# Patient Record
Sex: FEMALE | Race: WHITE | Employment: OTHER | URBAN - METROPOLITAN AREA
[De-identification: names, ages, dates, MRNs, and addresses within clinical notes are randomized per-mention and may not be internally consistent; named-entity substitution may affect disease eponyms.]

---

## 2017-12-29 ENCOUNTER — HOSPITAL ENCOUNTER (OUTPATIENT)
Dept: PREADMISSION TESTING | Age: 77
Discharge: HOME | End: 2017-12-29
Payer: MEDICARE

## 2017-12-29 VITALS
BODY MASS INDEX: 26.06 KG/M2 | WEIGHT: 138.01 LBS | OXYGEN SATURATION: 96 % | SYSTOLIC BLOOD PRESSURE: 174 MMHG | HEART RATE: 56 BPM | DIASTOLIC BLOOD PRESSURE: 82 MMHG | TEMPERATURE: 98 F | HEIGHT: 61 IN | RESPIRATION RATE: 20 BRPM

## 2017-12-29 LAB
ANION GAP SERPL CALC-SCNC: 9 MMOL/L (ref 5–15)
BASOPHILS # BLD: 0 K/UL (ref 0–0.1)
BASOPHILS NFR BLD: 0 % (ref 0–1)
BUN SERPL-MCNC: 15 MG/DL (ref 6–20)
BUN/CREAT SERPL: 25 (ref 12–20)
CALCIUM SERPL-MCNC: 10.1 MG/DL (ref 8.5–10.1)
CHLORIDE SERPL-SCNC: 106 MMOL/L (ref 97–108)
CO2 SERPL-SCNC: 29 MMOL/L (ref 21–32)
CREAT SERPL-MCNC: 0.6 MG/DL (ref 0.55–1.02)
EOSINOPHIL # BLD: 0 K/UL (ref 0–0.4)
EOSINOPHIL NFR BLD: 0 % (ref 0–7)
ERYTHROCYTE [DISTWIDTH] IN BLOOD BY AUTOMATED COUNT: 13.5 % (ref 11.5–14.5)
GLUCOSE SERPL-MCNC: 99 MG/DL (ref 65–100)
HCT VFR BLD AUTO: 40.3 % (ref 35–47)
HGB BLD-MCNC: 13 G/DL (ref 11.5–16)
LYMPHOCYTES # BLD: 2 K/UL (ref 0.8–3.5)
LYMPHOCYTES NFR BLD: 17 % (ref 12–49)
MCH RBC QN AUTO: 28.8 PG (ref 26–34)
MCHC RBC AUTO-ENTMCNC: 32.3 G/DL (ref 30–36.5)
MCV RBC AUTO: 89.2 FL (ref 80–99)
MONOCYTES # BLD: 0.5 K/UL (ref 0–1)
MONOCYTES NFR BLD: 5 % (ref 5–13)
NEUTS SEG # BLD: 8.8 K/UL (ref 1.8–8)
NEUTS SEG NFR BLD: 78 % (ref 32–75)
PLATELET # BLD AUTO: 303 K/UL (ref 150–400)
POTASSIUM SERPL-SCNC: 4.3 MMOL/L (ref 3.5–5.1)
RBC # BLD AUTO: 4.52 M/UL (ref 3.8–5.2)
SODIUM SERPL-SCNC: 144 MMOL/L (ref 136–145)
WBC # BLD AUTO: 11.3 K/UL (ref 3.6–11)

## 2017-12-29 PROCEDURE — 93005 ELECTROCARDIOGRAM TRACING: CPT

## 2017-12-29 PROCEDURE — 36415 COLL VENOUS BLD VENIPUNCTURE: CPT | Performed by: SURGERY

## 2017-12-29 PROCEDURE — 80048 BASIC METABOLIC PNL TOTAL CA: CPT | Performed by: SURGERY

## 2017-12-29 PROCEDURE — 85025 COMPLETE CBC W/AUTO DIFF WBC: CPT | Performed by: SURGERY

## 2017-12-29 RX ORDER — DORZOLAMIDE HCL 20 MG/ML
2 SOLUTION/ DROPS OPHTHALMIC 2 TIMES DAILY
COMMUNITY

## 2017-12-29 RX ORDER — PREDNISONE 1 MG/1
2 TABLET ORAL
COMMUNITY

## 2017-12-29 RX ORDER — PYRIDOXINE HCL (VITAMIN B6) 100 MG
100 TABLET ORAL
COMMUNITY

## 2017-12-29 RX ORDER — ATENOLOL 25 MG/1
12.5 TABLET ORAL DAILY
COMMUNITY

## 2017-12-29 RX ORDER — MULTIVITAMIN
1 TABLET ORAL
COMMUNITY

## 2017-12-29 NOTE — PERIOP NOTES
[facility] PREOPERATIVE INSTRUCTIONS    Surgery Date:   1/3/18    Surgery arrival time given by surgeon: NO  (If [facility] staff will call you between 3pm - 7pm the day before surgery with your arrival time. If your surgery is on a Monday, we will call you the preceding Friday. Please call [phone] after 7pm if you did not receive your arrival time.)  1. Report to the 2nd Floor Admitting Desk on the day of your surgery. Bring your insurance card, photo identification, and any copayment (if applicable). 2. You must have a responsible adult to drive you home and stay with you the first 24 hours after surgery if you are going home the same day of your surgery. 3. Nothing to eat or drink after midnight the night before surgery. This means NO water, gum, mints, coffee, juice, etc.    4. MEDICATIONS TO TAKE THE MORNING OF SURGERY WITH A SIP OF WATER:  Atenolol (if BP > 140/90), prednisone  5. No alcoholic beverages 24 hours before and after your surgery. 6. If you are being admitted to the hospital, please leave personal belongings/luggage in your car until you have an assigned hospital room number. ( The hospital discharge time is 12 PM NOON. Your adult  should be at the hospital prior to the noon discharge time unless otherwise instructed.)   7. STOP Aspirin and/or any non-steroidal anti-inflammatory drugs (i.e. Ibuprofen, Naproxen, Advil, Aleve) as directed by your surgeon. You may take Tylenol. Stop herbal supplements 1 week prior to surgery. 8. Per Dr. Federico Monique, stop taking Eliquis 2 days prior to surgery. 9. Wear comfortable clothes. Wear your glasses instead of contacts. Please leave all money, jewelry and valuables at home. No make-up, particularly mascara, the day of surgery. 10.  REMOVE ALL body piercings, rings, and jewelry and leave at home. Wear your hair loose or down, no pony-tails, buns, or any metal hair clips.    11. If you shower the morning of surgery, please do not apply any lotions, powders, or deodorants afterwards. Do not shave any body area within 24 hours of your surgery. 12. Please follow all instructions to avoid any potential surgical cancellation. 13. Should your physical condition change, (i.e. fever, cold, flu, etc.) please notify your surgeon as soon as possible. 14. It is important to be on time. If a situation occurs where you may be delayed, please call:  [phone] / [phone] on the day of surgery. 15. The [facility] staff can be reached at [phone]. 16. Special instructions: Bring Atenolol with you the day of surgery. The patient was contacted  in person. She  verbalize  understanding of all instructions does not  need reinforcement.

## 2017-12-30 LAB
ATRIAL RATE: 61 BPM
CALCULATED P AXIS, ECG09: 68 DEGREES
CALCULATED R AXIS, ECG10: 62 DEGREES
CALCULATED T AXIS, ECG11: 34 DEGREES
DIAGNOSIS, 93000: NORMAL
P-R INTERVAL, ECG05: 148 MS
Q-T INTERVAL, ECG07: 410 MS
QRS DURATION, ECG06: 78 MS
QTC CALCULATION (BEZET), ECG08: 412 MS
VENTRICULAR RATE, ECG03: 61 BPM

## 2018-01-02 ENCOUNTER — ANESTHESIA EVENT (OUTPATIENT)
Dept: SURGERY | Age: 78
End: 2018-01-02
Payer: MEDICARE

## 2018-01-02 RX ORDER — SODIUM CHLORIDE 0.9 % (FLUSH) 0.9 %
5-10 SYRINGE (ML) INJECTION AS NEEDED
Status: CANCELLED | OUTPATIENT
Start: 2018-01-02

## 2018-01-02 RX ORDER — HYDROMORPHONE HYDROCHLORIDE 1 MG/ML
.25-1 INJECTION, SOLUTION INTRAMUSCULAR; INTRAVENOUS; SUBCUTANEOUS
Status: CANCELLED | OUTPATIENT
Start: 2018-01-02

## 2018-01-02 RX ORDER — DIPHENHYDRAMINE HYDROCHLORIDE 50 MG/ML
12.5 INJECTION, SOLUTION INTRAMUSCULAR; INTRAVENOUS AS NEEDED
Status: CANCELLED | OUTPATIENT
Start: 2018-01-02 | End: 2018-01-02

## 2018-01-02 RX ORDER — SODIUM CHLORIDE, SODIUM LACTATE, POTASSIUM CHLORIDE, CALCIUM CHLORIDE 600; 310; 30; 20 MG/100ML; MG/100ML; MG/100ML; MG/100ML
125 INJECTION, SOLUTION INTRAVENOUS CONTINUOUS
Status: CANCELLED | OUTPATIENT
Start: 2018-01-02

## 2018-01-03 ENCOUNTER — APPOINTMENT (OUTPATIENT)
Dept: MAMMOGRAPHY | Age: 78
End: 2018-01-03
Attending: SURGERY
Payer: MEDICARE

## 2018-01-03 ENCOUNTER — ANESTHESIA (OUTPATIENT)
Dept: SURGERY | Age: 78
End: 2018-01-03
Payer: MEDICARE

## 2018-01-03 ENCOUNTER — HOSPITAL ENCOUNTER (OUTPATIENT)
Age: 78
Setting detail: OUTPATIENT SURGERY
Discharge: HOME | End: 2018-01-03
Attending: SURGERY | Admitting: SURGERY
Payer: MEDICARE

## 2018-01-03 VITALS
SYSTOLIC BLOOD PRESSURE: 108 MMHG | OXYGEN SATURATION: 97 % | HEART RATE: 61 BPM | HEIGHT: 61 IN | TEMPERATURE: 97.9 F | BODY MASS INDEX: 25.49 KG/M2 | RESPIRATION RATE: 24 BRPM | WEIGHT: 135 LBS | DIASTOLIC BLOOD PRESSURE: 52 MMHG

## 2018-01-03 DIAGNOSIS — N64.89 RADIAL SCAR OF BREAST: ICD-10-CM

## 2018-01-03 DIAGNOSIS — G89.18 ACUTE POST-OPERATIVE PAIN: Primary | ICD-10-CM

## 2018-01-03 PROCEDURE — 74011250636 HC RX REV CODE- 250/636: Performed by: ANESTHESIOLOGY

## 2018-01-03 PROCEDURE — 77030020782 HC GWN BAIR PAWS FLX 3M -B

## 2018-01-03 PROCEDURE — 74011000250 HC RX REV CODE- 250

## 2018-01-03 PROCEDURE — 77030018846 HC SOL IRR STRL H20 ICUM -A: Performed by: SURGERY

## 2018-01-03 PROCEDURE — 74011250636 HC RX REV CODE- 250/636

## 2018-01-03 PROCEDURE — 77030003460 HC NDL BIOP BRST COOK -B

## 2018-01-03 PROCEDURE — 77030031139 HC SUT VCRL2 J&J -A: Performed by: SURGERY

## 2018-01-03 PROCEDURE — 76210000026 HC REC RM PH II 1 TO 1.5 HR: Performed by: SURGERY

## 2018-01-03 PROCEDURE — 19281 PERQ DEVICE BREAST 1ST IMAG: CPT

## 2018-01-03 PROCEDURE — 77030011267 HC ELECTRD BLD COVD -A: Performed by: SURGERY

## 2018-01-03 PROCEDURE — 76010000138 HC OR TIME 0.5 TO 1 HR: Performed by: SURGERY

## 2018-01-03 PROCEDURE — 74011250636 HC RX REV CODE- 250/636: Performed by: SURGERY

## 2018-01-03 PROCEDURE — 76060000032 HC ANESTHESIA 0.5 TO 1 HR: Performed by: SURGERY

## 2018-01-03 PROCEDURE — 77030032490 HC SLV COMPR SCD KNE COVD -B: Performed by: SURGERY

## 2018-01-03 PROCEDURE — 88307 TISSUE EXAM BY PATHOLOGIST: CPT | Performed by: SURGERY

## 2018-01-03 PROCEDURE — 74011000250 HC RX REV CODE- 250: Performed by: SURGERY

## 2018-01-03 RX ORDER — LIDOCAINE HYDROCHLORIDE 10 MG/ML
0.1 INJECTION, SOLUTION EPIDURAL; INFILTRATION; INTRACAUDAL; PERINEURAL AS NEEDED
Status: DISCONTINUED | OUTPATIENT
Start: 2018-01-03 | End: 2018-01-03 | Stop reason: HOSPADM

## 2018-01-03 RX ORDER — FLUMAZENIL 0.1 MG/ML
0.2 INJECTION INTRAVENOUS
Status: DISCONTINUED | OUTPATIENT
Start: 2018-01-03 | End: 2018-01-03 | Stop reason: HOSPADM

## 2018-01-03 RX ORDER — LIDOCAINE HYDROCHLORIDE 10 MG/ML
4 INJECTION, SOLUTION EPIDURAL; INFILTRATION; INTRACAUDAL; PERINEURAL
Status: COMPLETED | OUTPATIENT
Start: 2018-01-03 | End: 2018-01-03

## 2018-01-03 RX ORDER — SODIUM CHLORIDE, SODIUM LACTATE, POTASSIUM CHLORIDE, CALCIUM CHLORIDE 600; 310; 30; 20 MG/100ML; MG/100ML; MG/100ML; MG/100ML
125 INJECTION, SOLUTION INTRAVENOUS CONTINUOUS
Status: DISCONTINUED | OUTPATIENT
Start: 2018-01-03 | End: 2018-01-03 | Stop reason: HOSPADM

## 2018-01-03 RX ORDER — DEXAMETHASONE SODIUM PHOSPHATE 100 MG/10ML
INJECTION INTRAMUSCULAR; INTRAVENOUS AS NEEDED
Status: DISCONTINUED | OUTPATIENT
Start: 2018-01-03 | End: 2018-01-03 | Stop reason: HOSPADM

## 2018-01-03 RX ORDER — SODIUM CHLORIDE 0.9 % (FLUSH) 0.9 %
5-10 SYRINGE (ML) INJECTION EVERY 8 HOURS
Status: DISCONTINUED | OUTPATIENT
Start: 2018-01-03 | End: 2018-01-03 | Stop reason: HOSPADM

## 2018-01-03 RX ORDER — LIDOCAINE HYDROCHLORIDE 10 MG/ML
INJECTION, SOLUTION EPIDURAL; INFILTRATION; INTRACAUDAL; PERINEURAL
Status: COMPLETED
Start: 2018-01-03 | End: 2018-01-03

## 2018-01-03 RX ORDER — LIDOCAINE HYDROCHLORIDE 20 MG/ML
INJECTION, SOLUTION EPIDURAL; INFILTRATION; INTRACAUDAL; PERINEURAL AS NEEDED
Status: DISCONTINUED | OUTPATIENT
Start: 2018-01-03 | End: 2018-01-03 | Stop reason: HOSPADM

## 2018-01-03 RX ORDER — SODIUM CHLORIDE 0.9 % (FLUSH) 0.9 %
5-10 SYRINGE (ML) INJECTION AS NEEDED
Status: DISCONTINUED | OUTPATIENT
Start: 2018-01-03 | End: 2018-01-03 | Stop reason: HOSPADM

## 2018-01-03 RX ORDER — MIDAZOLAM HYDROCHLORIDE 1 MG/ML
INJECTION, SOLUTION INTRAMUSCULAR; INTRAVENOUS AS NEEDED
Status: DISCONTINUED | OUTPATIENT
Start: 2018-01-03 | End: 2018-01-03 | Stop reason: HOSPADM

## 2018-01-03 RX ORDER — PROPOFOL 10 MG/ML
INJECTION, EMULSION INTRAVENOUS
Status: DISCONTINUED | OUTPATIENT
Start: 2018-01-03 | End: 2018-01-03 | Stop reason: HOSPADM

## 2018-01-03 RX ORDER — NALOXONE HYDROCHLORIDE 0.4 MG/ML
0.2 INJECTION, SOLUTION INTRAMUSCULAR; INTRAVENOUS; SUBCUTANEOUS
Status: DISCONTINUED | OUTPATIENT
Start: 2018-01-03 | End: 2018-01-03 | Stop reason: HOSPADM

## 2018-01-03 RX ORDER — HYDROCODONE BITARTRATE AND ACETAMINOPHEN 5; 325 MG/1; MG/1
1 TABLET ORAL
Qty: 30 TAB | Refills: 0 | Status: SHIPPED | OUTPATIENT
Start: 2018-01-03

## 2018-01-03 RX ORDER — LIDOCAINE HYDROCHLORIDE AND EPINEPHRINE 20; 5 MG/ML; UG/ML
INJECTION, SOLUTION EPIDURAL; INFILTRATION; INTRACAUDAL; PERINEURAL AS NEEDED
Status: DISCONTINUED | OUTPATIENT
Start: 2018-01-03 | End: 2018-01-03 | Stop reason: HOSPADM

## 2018-01-03 RX ORDER — PROPOFOL 10 MG/ML
INJECTION, EMULSION INTRAVENOUS AS NEEDED
Status: DISCONTINUED | OUTPATIENT
Start: 2018-01-03 | End: 2018-01-03 | Stop reason: HOSPADM

## 2018-01-03 RX ORDER — FENTANYL CITRATE 50 UG/ML
INJECTION, SOLUTION INTRAMUSCULAR; INTRAVENOUS AS NEEDED
Status: DISCONTINUED | OUTPATIENT
Start: 2018-01-03 | End: 2018-01-03 | Stop reason: HOSPADM

## 2018-01-03 RX ADMIN — PROPOFOL 50 MCG/KG/MIN: 10 INJECTION, EMULSION INTRAVENOUS at 10:56

## 2018-01-03 RX ADMIN — LIDOCAINE HYDROCHLORIDE 1 ML: 10 INJECTION, SOLUTION EPIDURAL; INFILTRATION; INTRACAUDAL; PERINEURAL at 09:15

## 2018-01-03 RX ADMIN — FENTANYL CITRATE 25 MCG: 50 INJECTION, SOLUTION INTRAMUSCULAR; INTRAVENOUS at 10:56

## 2018-01-03 RX ADMIN — CEFAZOLIN 0.2 G: 1 INJECTION, POWDER, FOR SOLUTION INTRAMUSCULAR; INTRAVENOUS; PARENTERAL at 10:30

## 2018-01-03 RX ADMIN — MIDAZOLAM HYDROCHLORIDE 2 MG: 1 INJECTION, SOLUTION INTRAMUSCULAR; INTRAVENOUS at 10:52

## 2018-01-03 RX ADMIN — DEXAMETHASONE SODIUM PHOSPHATE 4 MG: 100 INJECTION INTRAMUSCULAR; INTRAVENOUS at 11:07

## 2018-01-03 RX ADMIN — SODIUM CHLORIDE, SODIUM LACTATE, POTASSIUM CHLORIDE, AND CALCIUM CHLORIDE 125 ML/HR: 600; 310; 30; 20 INJECTION, SOLUTION INTRAVENOUS at 10:25

## 2018-01-03 RX ADMIN — LIDOCAINE HYDROCHLORIDE 25 MG: 20 INJECTION, SOLUTION EPIDURAL; INFILTRATION; INTRACAUDAL; PERINEURAL at 10:56

## 2018-01-03 RX ADMIN — PROPOFOL 30 MG: 10 INJECTION, EMULSION INTRAVENOUS at 10:56

## 2018-01-03 NOTE — OP NOTES
Operative Report    Patient: Jax Moya MRN: [id number]  SSN: [id number]    YOB: 1940  Age: 69 y.o. Sex: female      Indications: This patient has an abnormal mammogram and underwent preoperative guidewire localization of the mammographic abnormality that was found to be a radial scar on pre op biopsy. Date of Surgery: 1/3/2018     Preoperative Diagnosis: RADIAL SCAR RIGHT BREAST     Postoperative Diagnosis: RADIAL SCAR RIGHT BREAST     Anesthesia:  MAC    Surgeon(s) and Role:     * Ashleigh Huang MD - Primary     Procedure: Procedure(s):  RIGHT NEEDLE LOCALIZATION BREAST BIOPSY    Procedure in Detail:   The patient was taken to the operating room, identified as Jax Moya, and the procedure verified. A Time Out was held and the above information confirmed. The patient underwent preoperative guidewire localization of a mammographic abnormality in the lateral aspect of the right breast.  The patient was brought to the operating room and placed supine. MAC anesthesia was induced. The breast was prepped and draped in standard fashion. Lidocaine 2% with epinephrine was used to anesthetize the skin at the site of the guidewire placement. A curvilinear incision was created. Dissection was carried down to the localized area which was completely excised. A specimen radiograph confirmed inclusion of the preoperatively identified mammographic lesion and clip. Hemostasis was achieved. Closure was performed using 3-0 Vicryl interrupted and 4-0 Vicryl subcuticular. Dermabond was applied. At the end of the operation, all sponge, instrument, and needle counts were correct times two. Jax Moya tolerated the procedure well. The patient was transferred to recovery room in stable condition.       Findings: clip within specimen    Estimated Blood Loss:  minimal           Drains: none        Specimens:   ID Type Source Tests Collected by Time Destination   1 : right breast needle loc. biopsy Fresh Breast  Reubin Blizzard, MD 1/3/2018 1119 Pathology        Implants: * No implants in log *           Complications:  None; patient tolerated the procedure well.            Signed By: Reubin Blizzard, MD     January 3, 2018

## 2018-01-03 NOTE — ANESTHESIA PREPROCEDURE EVALUATION
Anesthetic History   No history of anesthetic complications            Review of Systems / Medical History  Patient summary reviewed and pertinent labs reviewed    Pulmonary        Sleep apnea: No treatment           Neuro/Psych   Within defined limits           Cardiovascular    Hypertension    Angina    Dysrhythmias : atrial fibrillation  CAD    Exercise tolerance: >4 METS  Comments: Stable angina  Hx of episodes of bradycardia   GI/Hepatic/Renal     GERD: well controlled           Endo/Other        Arthritis     Other Findings   Comments: Chronic pain           Physical Exam    Airway  Mallampati: II  TM Distance: 4 - 6 cm  Neck ROM: decreased range of motion   Mouth opening: Diminished (comment)     Cardiovascular    Rhythm: irregular  Rate: normal         Dental  No notable dental hx       Pulmonary  Breath sounds clear to auscultation               Abdominal         Other Findings            Anesthetic Plan    ASA: 3  Anesthesia type: MAC            Anesthetic plan and risks discussed with: Patient

## 2018-01-03 NOTE — IP AVS SNAPSHOT
[address] 
[phone] Patient: Sulam Syed MRN: [id number] FNM:3/0/2921 About your hospitalization You were admitted on:  January 3, 2018 You last received care in the:  [facility] PACU You were discharged on:  January 3, 2018 Why you were hospitalized Your primary diagnosis was:  Not on File Follow-up Information Follow up With Details Comments Contact Info Marthenia Schwab, MD   [address] 
[phone] Discharge Orders None A check ellie indicates which time of day the medication should be taken. My Medications START taking these medications Instructions Each Dose to Equal  
 Morning Noon Evening Bedtime HYDROcodone-acetaminophen 5-325 mg per tablet Commonly known as:  Olgamagdy Dimas Your last dose was: Your next dose is: Take 1 Tab by mouth every four (4) hours as needed for Pain. Max Daily Amount: 6 Tabs. 1 Tab CONTINUE taking these medications Instructions Each Dose to Equal  
 Morning Noon Evening Bedtime  
 atenolol 25 mg tablet Commonly known as:  TENORMIN Your last dose was: Your next dose is: Take 12.5 mg by mouth daily. If BP >140/90, take 12.5mg (takes at breakfast if needed) 12.5 mg  
    
   
   
   
  
 calcium-cholecalciferol (D3) tablet Commonly known as:  CALTRATE 600+D Your last dose was: Your next dose is: Take 1 Tab by mouth every morning. 1 Tab CENTRUM SILVER PO Your last dose was: Your next dose is: Take 1 Tab by mouth every morning. 1 Tab  
    
   
   
   
  
 dorzolamide 2 % ophthalmic solution Commonly known as:  TRUSOPT Your last dose was: Your next dose is: Administer 2 Drops to both eyes two (2) times a day. 2 Drop ELIQUIS 5 mg tablet Generic drug:  apixaban Your last dose was: Your next dose is: Take 5 mg by mouth two (2) times a day. 1000/2200  
 5 mg OTHER Your last dose was: Your next dose is:    
   
   
 Administer 1 Drop to both eyes three (3) times daily. Equate eye drops lubricant 1 Drop  
    
   
   
   
  
 predniSONE 1 mg tablet Commonly known as:  Porfirio Ahuja Your last dose was: Your next dose is: Take 2 mg by mouth every morning. 2 mg VITAMIN B-6 100 mg tablet Generic drug:  pyridoxine (vitamin B6) Your last dose was: Your next dose is: Take 100 mg by mouth every morning. 100 mg Where to Get Your Medications Information on where to get these meds will be given to you by the nurse or doctor. ! Ask your nurse or doctor about these medications HYDROcodone-acetaminophen 5-325 mg per tablet My Medications TAKE these medications as instructed Instructions Each Dose to Equal  
 Morning Noon Evening Bedtime  
 atenolol 25 mg tablet Commonly known as:  TENORMIN Your last dose was: Your next dose is: Take 12.5 mg by mouth daily. If BP >140/90, take 12.5mg (takes at breakfast if needed) 12.5 mg  
    
   
   
   
  
 calcium-cholecalciferol (D3) tablet Commonly known as:  CALTRATE 600+D Your last dose was: Your next dose is: Take 1 Tab by mouth every morning. 1 Tab CENTRUM SILVER PO Your last dose was: Your next dose is: Take 1 Tab by mouth every morning. 1 Tab  
    
   
   
   
  
 dorzolamide 2 % ophthalmic solution Commonly known as:  TRUSOPT Your last dose was: Your next dose is:    
   
   
 Administer 2 Drops to both eyes two (2) times a day. 2 Drop ELIQUIS 5 mg tablet Generic drug:  apixaban Your last dose was: Your next dose is: Take 5 mg by mouth two (2) times a day. 1000/2200  
 5 mg HYDROcodone-acetaminophen 5-325 mg per tablet Commonly known as:  [place]pe Elms Your last dose was: Your next dose is: Take 1 Tab by mouth every four (4) hours as needed for Pain. Max Daily Amount: 6 Tabs. 1 Tab OTHER Your last dose was: Your next dose is:    
   
   
 Administer 1 Drop to both eyes three (3) times daily. Equate eye drops lubricant 1 Drop  
    
   
   
   
  
 predniSONE 1 mg tablet Commonly known as:  Meg Blum Your last dose was: Your next dose is: Take 2 mg by mouth every morning. 2 mg VITAMIN B-6 100 mg tablet Generic drug:  pyridoxine (vitamin B6) Your last dose was: Your next dose is: Take 100 mg by mouth every morning. 100 mg Where to Get Your Medications Information on where to get these meds will be given to you by the nurse or doctor. ! Ask your nurse or doctor about these medications HYDROcodone-acetaminophen 5-325 mg per tablet Discharge Instructions General Discharge Instructions Patient ID: 
Giovana Terry [id number] 
41 y.o. 
1940 PATIENT INSTRUCTIONS: 
 
Take Home Medications What to do at Home: 
 
Recommended diet: as tolerated. Recommended activity: Activity as tolerated and no driving for today. No driving while still taking norco.  You may shower this evening if you would like. If you experience any of the following symptoms redness, drainage, please follow up with me at [phone]. Follow-up with Dr. Yessy Benz in 7-10 days. Information obtained by : 
I understand that if any problems occur once I am at home I am to contact my physician. I understand and acknowledge receipt of the instructions indicated above. Physician's or R.N.'s Signature                                                                  Date/Time Patient or Representative Signature                                                          Date/Time 
 
 
 
______________________________________________________________________ Anesthesia Discharge Instructions After general anesthesia or intervenous sedation, for 24 hours or while taking prescription Narcotics: · Limit your activities · Do not drive or operate hazardous machinery · If you have not urinated within 8 hours after discharge, please contact your surgeon on call. · Do not make important personal or business decisions · Do not drink alcoholic beverages Report the following to your surgeon: 
· Excessive pain, swelling, redness or odor of or around the surgical area · Temperature over 100.5 degrees · Nausea and vomiting lasting longer than 4 hours or if unable to take medication · Any signs of decreased circulation or nerve impairment to extremity:  Change in color, persistent numbness, tingling, coldness or increased pain. · Any questions 
 
______________________________________________________________________ How to Care for A Wound With Skin Glue Topical skin glue is a sterile, liquid skin adhesive that holds wound edges together. The film will usually last 7-10 days, then begin to loosen from your skin. The glue may go by different names, depending on the maker of the product. The following may answer some of your questions and provide wound care instructions: CHECK THE WOUND APPEARANCE · Swelling, redness, and pain are common with all wounds - these normally go away as the wound heals · If swelling, redness, or pain increases, or if the wound feels warm to the touch call your doctor · Contact your doctor if the wound edges reopen or separate REPLACE BANDAGES 
· If your wound is bandaged, keep the bandage dry · Replace the bandage daily until the glue has fallen off, or if the bandage should become wet, unless otherwise instructed by your doctor · When changing the dressing, do not place tape directly over the glue -  removing the tape later may also remove the glue before the wound has had time to heal 
 
AVOIDING TOPICAL MEDICATIONS · Do not apply any liquid, ointment medication, or any other product to your wound while the glue is in place - these may loosen the glue before your wound is healed KEEP WOUND DRY AND PROTECTED · You may briefly wet your wound in the shower if permitted by your surgeon. · Do not soak/immerse your wound; do not swim; avoid periods of heavy perspiration until the glue has naturally fallen off · After showering, gently blot your wound dry with a soft towel. If a protective dressing is being used, apply a fresh, dry bandage · Apply a clean, dry bandage over the wound if necessary to protect it · Protect your wound from injury until the skin has had sufficient time to heal 
· Do not scratch, rub, scrub, or pick at the glue - these may loosen the glue before your wound is healed · Protect the wound from prolonged exposure to sunlight or tanning lamps while the glue is in place If you have any questions or concerns about this product, please consult your doctor. [employer] Activation Thank you for requesting access to [employer].  Please follow the instructions below to securely access and download your online medical record. [employer] allows you to send messages to your doctor, view your test results, renew your prescriptions, schedule appointments, and more. How Do I Sign Up? 1. In your internet browser, go to [email or web address] 
2. Click on the First Time User? Click Here link in the Sign In box. You will be redirect to the New Member Sign Up page. 3. Enter your [employer] Access Code exactly as it appears below. You will not need to use this code after youve completed the sign-up process. If you do not sign up before the expiration date, you must request a new code. [employer] Access Code: Activation code not generated Current JetPay Status: Patient Declined (This is the date your JetPay access code will ) 4. Enter the last four digits of your Social Security Number (xxxx) and Date of Birth (mm/dd/yyyy) as indicated and click Submit. You will be taken to the next sign-up page. 5. Create a JetPay ID. This will be your JetPay login ID and cannot be changed, so think of one that is secure and easy to remember. 6. Create a JetPay password. You can change your password at any time. 7. Enter your Password Reset Question and Answer. This can be used at a later time if you forget your password. 8. Enter your e-mail address. You will receive e-mail notification when new information is available in [address] 9. Click Sign Up. You can now view and download portions of your medical record. 10. Click the Download Summary menu link to download a portable copy of your medical information. Additional Information If you have questions, please visit the Frequently Asked Questions section of the JetPay website at [email or web address] Remember, JetPay is NOT to be used for urgent needs. For medical emergencies, dial 911. Learning About Deep Vein Thrombosis What is deep vein thrombosis? A deep vein thrombosis (DVT) is a blood clot in certain veins of the legs, pelvis, or arms. The clot is usually in the legs. DVT may damage the vein and cause the area to ache, swell, and change color. DVT also can lead to sores. DVT in these veins needs to be treated because the clots can get bigger, break loose, and travel through the bloodstream to the lungs. A blood clot in a lung can cause death. Blood clots can form in the veins when you are not active for a long period of time. For example, they can form if you need to stay in bed because of a health problem or must sit for a long time on an airplane or in a car. Surgery or an injury can damage your blood vessels and cause a clot to form. Cancer also can cause DVT. And some people have blood that clots too easily, which is a problem that may run in families. A risk factor is something that makes you more likely to develop a disease. Here are some major risk factors for DVT: 
· You have surgery. · You have to stay in bed for more than 3 days (such as in the hospital). · Your blood is likely to clot because of an injury, cancer, or inherited condition. Here are some minor risk factors for DVT: 
· You take birth control hormones. · You are pregnant. · You are in a car or airplane for a long trip. What are the symptoms? Symptoms of DVT may include: · Swelling in the affected area. · Redness and warmth in the affected area. · Pain or tenderness. You may have pain only when you touch the affected area or when you stand or walk. If your doctor thinks you may have DVT, you will probably have an ultrasound test. You may have other tests as well. How can you prevent DVT? · Exercise your lower leg muscles to help blood flow in your legs. Point your toes up toward your head so the calves of your legs are stretched, then relax and repeat. This is a good exercise to do when you are sitting for long periods of time. · Get out of bed as soon as you can after an illness or surgery. If you need to stay in bed, do the leg exercise noted above every hour when you are awake. · Use special stockings called compression stockings. These stockings are tight at the feet with a gradually looser fit on the leg. Many doctors recommend that you wear compression stockings during a journey longer than 8 hours. · Take breaks when you are on long trips. Stop the car and walk around. On long airplane flights, walk up and down the aisle hourly, flex and point your feet every 20 minutes while sitting, and drink plenty of water. · Take blood-thinning medicines before and after some types of surgery if your doctor recommends it. Blood thinners also may be used if you are likely to develop clots. How is DVT treated? Treatment for DVT usually involves taking blood thinners. These medicines are given through a vein (intravenously, or IV) or as a pill. You will have blood tests often so your doctor can see how well the blood thinners are working. Your doctor also may suggest that you prop up or elevate your leg when possible, take walks, and wear compression stockings. These measures may help reduce the pain and swelling that can happen with DVT. Follow-up care is a key part of your treatment and safety. Be sure to make and go to all appointments, and call your doctor if you are having problems. It's also a good idea to know your test results and keep a list of the medicines you take. Tips to Prevent Blood Clots: After Your Visit Your Care Instructions Blood clots can lead to serious problems, including DVT and pulmonary embolism. A DVT, if not treated, can cause blood clots in these veins to get bigger, break loose, and travel through the bloodstream to the lungs. Pulmonary embolism is the sudden blockage of an artery in the lung. This is a life-threatening emergency.  
Anything that makes you more likely to form blood clots increases your risk of DVT and pulmonary embolism. It is important to know your risk factors, as well as the steps you and your doctor can take to reduce your risk for blood clots. Follow-up care is a key part of your treatment and safety. Be sure to make and go to all appointments, and call your doctor if you are having problems. It's also a good idea to know your test results and keep a list of the medicines you take. How can you care for yourself at home? Know what increases your risk for blood clots · You might be at risk for blood clots because of a medical procedure or hospital stay. Things that increase your risk include: ¨ Recent surgery that involved the legs or belly. ¨ Staying in bed for 72 hours or more after surgery or a serious illness. ¨ Some diseases, such as cancer, heart failure, stroke, or a severe infection. ¨ Pregnancy and childbirth (especially if you had a  section). · Other things can increase your risk for blood clots, including: ¨ Being inactive for long periods, or sitting for 6 hours or longer, such as on a flight or car trip. ¨ Having blood that clots too easily, a problem that may run in families. ¨ Taking birth control pills or hormone therapy. ¨ Smoking. ¨ Being overweight. To prevent a DVT or pulmonary embolism · If your doctor has prescribed anticoagulant medicines, take them exactly as directed. · Get up out of bed as soon as possible after an illness or surgery. · Exercise. Keep blood moving in your legs to keep clots from forming. If you are traveling by car, stop every hour or so. Get out and walk around for a few minutes. If you are traveling by bus, train, or plane, get out of your seat and walk up and down the aisles every hour or so. You also can do leg exercises while you are seated. Pump your feet up and down by pulling your toes up toward your knees and then pointing them down (see below.) · Do not smoke.  If you need help quitting, talk to your doctor about stop-smoking programs and medicines. These can increase your chances of quitting for good. · Check with your doctor before taking birth control pills or hormone replacement therapy. These may increase your risk of blot clots. · Ask your doctor about wearing compression stockings to help prevent blood clots in your legs. You can buy these with a prescription at medical supply stores and some drugstores. When should you call for help? Call 911 anytime you think you may need emergency care. For example, call if: 
· You have signs of pulmonary embolism. These may include: 
¨ Sudden shortness of breath. ¨ Sudden, sharp chest pain that may get worse when you breathe deeply or cough. ¨ Coughing up blood or pink, foamy mucus. ¨ Fast heart rate. ¨ Severe anxiety. ¨ Fainting. Call your doctor now or seek immediate medical care if: 
· You have signs of a deep vein thrombosis. These may include: ¨ Swelling. ¨ Warmth. ¨ Pain or tenderness. The pain may be in the calf or thigh and may be present only when the affected area is touched or when standing or walking. ¨ Redness. Watch closely for changes in your health, and be sure to contact your doctor if: 
· You do not get better as expected. Ankle Pumps Ankle pumps increase the circulation of oxygenated blood to your lower extremities and decrease your risk for circulation problems such as blood clots. They also stretch the muscles, tendons and ligaments in your foot and ankle, and prevent joint contracture in the ankle and foot, especially after surgeries on the legs. It is important to do ankle pump exercises regularly after surgery because immobility increases your risk for developing a blood clot. Your doctor may also have you take an Aspirin for the next few days as well. If your doctor did not ask you to take an Aspirin, consult with him before starting Aspirin therapy on your own. This exercise is as follows: 2. Slowly point your foot forward, feeling the muscles on the top of your lower leg stretch, and hold this position for 5 seconds. 3. Next, pull your foot back toward you as far as possible, stretching the calf muscles, and hold that position for 5 seconds. 4. Repeat with the other foot. 5. Perform 10 repetitions every hour while awake for both ankles if possible (down and then up with the foot once is one repetition). You should feel gentle stretching of the muscles in your lower leg when doing this exercise. If you feel pain, or your range of motion is limited, don't  Push too hard. Only go the limit your joint and muscles will let you go. If you have increasing pain, progressively worsening leg warmth or swelling, STOP the exercise and call your doctor. Providers Seen During Your Hospitalization Provider Specialty Primary office phone Ashleigh Huang MD General Surgery [phone] Your Primary Care Physician (PCP) Primary Care Physician Office Phone Office Fax Giles Javed 8 [phone] You are allergic to the following Allergen Reactions Zetia (Ezetimibe) Other (comments) Muscle cramps Amoxicillin Rash Aspirin Other (comments) Severe nose bleeds Cardizem (Diltiazem Hcl) Cough Compazine (Prochlorperazine) Unknown (comments) Tardive dyskensia Crestor (Rosuvastatin) Other (comments) Muscle cramps Demerol (Meperidine) Unknown (comments)  
 hallucinations Lipitor (Atorvastatin) Other (comments) Muscle cramps Mucinex (Guaifenesin) Other (comments) Dry mucous membranes Penicillins Hives Ancef graded challenge done 1/3/18 with no reactions post.  
    
 Simvastatin Other (comments) Muscle cramps Sulfa (Sulfonamide Antibiotics) Hives Recent Documentation Height Weight BMI OB Status Smoking Status 1.549 m 61.2 kg 25.51 kg/m2 Postmenopausal Never Smoker Emergency Contacts Name Discharge Info Relation Home Work Mobile [address] [facility] CAREGIVER [3] Child [2] [phone] Audrey Henriquez  Daughter [21] [phone] Patient Belongings The following personal items are in your possession at time of discharge: 
  Dental Appliances: None  Visual Aid: Glasses Please provide this summary of care documentation to your next provider. Signatures-by signing, you are acknowledging that this After Visit Summary has been reviewed with you and you have received a copy. Patient Signature:  ____________________________________________________________ Date:  ____________________________________________________________  
  
Domonique Deiters Provider Signature:  ____________________________________________________________ Date:  ____________________________________________________________

## 2018-01-03 NOTE — ANESTHESIA POSTPROCEDURE EVALUATION
Post-Anesthesia Evaluation and Assessment    Patient: Sabrina Mata MRN: [id number]  SSN: [id number]    YOB: 1940  Age: 69 y.o. Sex: female       Cardiovascular Function/Vital Signs  Visit Vitals    /52    Pulse 61    Temp 36.6 °C (97.9 °F)    Resp 24    Ht 5' 1\" (1.549 m)    Wt 61.2 kg (135 lb)    SpO2 97%    BMI 25.51 kg/m2       Patient is status post MAC anesthesia for Procedure(s):  RIGHT NEEDLE LOCALIZATION BREAST BIOPSY. Nausea/Vomiting: None    Postoperative hydration reviewed and adequate. Pain:  Pain Scale 1: Numeric (0 - 10) (01/03/18 1205)  Pain Intensity 1: 1 (01/03/18 1205)   Managed    Neurological Status:   Neuro (WDL): Within Defined Limits (01/03/18 1205)  Neuro  LUE Motor Response: Purposeful (01/03/18 1205)  LLE Motor Response: Purposeful (01/03/18 1205)  RUE Motor Response: Purposeful (01/03/18 1205)  RLE Motor Response: Purposeful (01/03/18 1205)   At baseline    Mental Status and Level of Consciousness: Arousable    Pulmonary Status:   O2 Device: Room air (01/03/18 1205)   Adequate oxygenation and airway patent    Complications related to anesthesia: None    Post-anesthesia assessment completed.  No concerns    Signed By: Stormy Gore MD     January 3, 2018

## 2018-01-03 NOTE — IP AVS SNAPSHOT
[address] 
[phone] Patient: Sanjay Landeros MRN: [id number] SZQ:[birth year] A check ellie indicates which time of day the medication should be taken. My Medications START taking these medications Instructions Each Dose to Equal  
 Morning Noon Evening Bedtime HYDROcodone-acetaminophen 5-325 mg per tablet Commonly known as:  [place] Daralexa Your last dose was: Your next dose is: Take 1 Tab by mouth every four (4) hours as needed for Pain. Max Daily Amount: 6 Tabs. 1 Tab CONTINUE taking these medications Instructions Each Dose to Equal  
 Morning Noon Evening Bedtime  
 atenolol 25 mg tablet Commonly known as:  TENORMIN Your last dose was: Your next dose is: Take 12.5 mg by mouth daily. If BP >140/90, take 12.5mg (takes at breakfast if needed) 12.5 mg  
    
   
   
   
  
 calcium-cholecalciferol (D3) tablet Commonly known as:  CALTRATE 600+D Your last dose was: Your next dose is: Take 1 Tab by mouth every morning. 1 Tab CENTRUM SILVER PO Your last dose was: Your next dose is: Take 1 Tab by mouth every morning. 1 Tab  
    
   
   
   
  
 dorzolamide 2 % ophthalmic solution Commonly known as:  TRUSOPT Your last dose was: Your next dose is:    
   
   
 Administer 2 Drops to both eyes two (2) times a day. 2 Drop ELIQUIS 5 mg tablet Generic drug:  apixaban Your last dose was: Your next dose is: Take 5 mg by mouth two (2) times a day. 1000/2200  
 5 mg OTHER Your last dose was: Your next dose is:    
   
   
 Administer 1 Drop to both eyes three (3) times daily. Equate eye drops lubricant 1 Drop predniSONE 1 mg tablet Commonly known as:  Porfirio Ahuja Your last dose was: Your next dose is: Take 2 mg by mouth every morning. 2 mg VITAMIN B-6 100 mg tablet Generic drug:  pyridoxine (vitamin B6) Your last dose was: Your next dose is: Take 100 mg by mouth every morning. 100 mg Where to Get Your Medications Information on where to get these meds will be given to you by the nurse or doctor. ! Ask your nurse or doctor about these medications HYDROcodone-acetaminophen 5-325 mg per tablet

## 2018-01-03 NOTE — DISCHARGE INSTRUCTIONS
General Discharge Instructions    Patient ID:  Carl Doshi  [id number]  69 y.o.  1940    PATIENT INSTRUCTIONS:    Take Home Medications       What to do at Home:    Recommended diet: as tolerated. Recommended activity: Activity as tolerated and no driving for today. No driving while still taking norco.  You may shower this evening if you would like. If you experience any of the following symptoms redness, drainage, please follow up with me at [phone]. Follow-up with Dr. Eleanor Tijerina in 7-10 days. Information obtained by :  I understand that if any problems occur once I am at home I am to contact my physician. I understand and acknowledge receipt of the instructions indicated above. Physician's or R.N.'s Signature                                                                  Date/Time                                                                                                                                              Patient or Representative Signature                                                          Date/Time        ______________________________________________________________________    Anesthesia Discharge Instructions    After general anesthesia or intervenous sedation, for 24 hours or while taking prescription Narcotics:  · Limit your activities  · Do not drive or operate hazardous machinery  · If you have not urinated within 8 hours after discharge, please contact your surgeon on call.   · Do not make important personal or business decisions  · Do not drink alcoholic beverages    Report the following to your surgeon:  · Excessive pain, swelling, redness or odor of or around the surgical area  · Temperature over 100.5 degrees  · Nausea and vomiting lasting longer than 4 hours or if unable to take medication  · Any signs of decreased circulation or nerve impairment to extremity:  Change in color, persistent numbness, tingling, coldness or increased pain. · Any questions    ______________________________________________________________________        How to Care for A Wound With Skin Glue    Topical skin glue is a sterile, liquid skin adhesive that holds wound edges together. The film will usually last 7-10 days, then begin to loosen from your skin. The glue may go by different names, depending on the maker of the product. The following may answer some of your questions and provide wound care instructions:    [address]  · Swelling, redness, and pain are common with all wounds - these normally go away as the wound heals    · If swelling, redness, or pain increases, or if the wound feels warm to the touch call your doctor   · Contact your doctor if the wound edges reopen or separate    REPLACE BANDAGES  · If your wound is bandaged, keep the bandage dry  · Replace the bandage daily until the glue has fallen off, or if the bandage should become wet, unless otherwise instructed by your doctor  · When changing the dressing, do not place tape directly over the glue -  removing the tape later may also remove the glue before the wound has had time to heal    AVOIDING TOPICAL MEDICATIONS  · Do not apply any liquid, ointment medication, or any other product to your wound while the glue is in place - these may loosen the glue before your wound is healed    KEEP WOUND DRY AND PROTECTED  · You may briefly wet your wound in the shower if permitted by your surgeon. · Do not soak/immerse your wound; do not swim; avoid periods of heavy perspiration until the glue has naturally fallen off   · After showering, gently blot your wound dry with a soft towel.  If a protective dressing is being used, apply a fresh, dry bandage  · Apply a clean, dry bandage over the wound if necessary to protect it  · Protect your wound from injury until the skin has had sufficient time to heal  · Do not scratch, rub, scrub, or pick at the glue - these may loosen the glue before your wound is healed  · Protect the wound from prolonged exposure to sunlight or tanning lamps while the glue is in place     If you have any questions or concerns about this product, please consult your doctor. [employer] Activation    Thank you for requesting access to [facility]. Please follow the instructions below to securely access and download your online medical record. [facility] allows you to send messages to your doctor, view your test results, renew your prescriptions, schedule appointments, and more. How Do I Sign Up? 1. In your internet browser, go to [email or web address] Direct  2. Click on the First Time User? Click Here link in the Sign In box. You will be redirect to the New Member Sign Up page. 3. Enter your [facility] Access Code exactly as it appears below. You will not need to use this code after youve completed the sign-up process. If you do not sign up before the expiration date, you must request a new code. [facility] Access Code: Activation code not generated  Current [facility] Status: Patient Declined (This is the date your [facility] access code will )    4. Enter the last four digits of your Social Security Number (xxxx) and Date of Birth (mm/dd/yyyy) as indicated and click Submit. You will be taken to the next sign-up page. 5. Create a [facility] ID. This will be your [facility] login ID and cannot be changed, so think of one that is secure and easy to remember. 6. Create a [facility] password. You can change your password at any time. 7. Enter your Password Reset Question and Answer. This can be used at a later time if you forget your password. 8. Enter your e-mail address. You will receive e-mail notification when new information is available in [address] 9. Click Sign Up. You can now view and download portions of your medical record.   10. Click the Download Summary menu link to download a portable copy of your medical information. Additional Information    If you have questions, please visit the Frequently Asked Questions section of the [facility] website at [email or web address] Remember, [facility] is NOT to be used for urgent needs. For medical emergencies, dial 911. Learning About Deep Vein Thrombosis  What is deep vein thrombosis? A deep vein thrombosis (DVT) is a blood clot in certain veins of the legs, pelvis, or arms. The clot is usually in the legs. DVT may damage the vein and cause the area to ache, swell, and change color. DVT also can lead to sores. DVT in these veins needs to be treated because the clots can get bigger, break loose, and travel through the bloodstream to the lungs. A blood clot in a lung can cause death. Blood clots can form in the veins when you are not active for a long period of time. For example, they can form if you need to stay in bed because of a health problem or must sit for a long time on an airplane or in a car. Surgery or an injury can damage your blood vessels and cause a clot to form. Cancer also can cause DVT. And some people have blood that clots too easily, which is a problem that may run in families. A risk factor is something that makes you more likely to develop a disease. Here are some major risk factors for DVT:  · You have surgery. · You have to stay in bed for more than 3 days (such as in the hospital). · Your blood is likely to clot because of an injury, cancer, or inherited condition. Here are some minor risk factors for DVT:  · You take birth control hormones. · You are pregnant. · You are in a car or airplane for a long trip. What are the symptoms? Symptoms of DVT may include:  · Swelling in the affected area. · Redness and warmth in the affected area. · Pain or tenderness. You may have pain only when you touch the affected area or when you stand or walk.   If your doctor thinks you may have DVT, you will probably have an ultrasound test. You may have other tests as well. How can you prevent DVT? · Exercise your lower leg muscles to help blood flow in your legs. Point your toes up toward your head so the calves of your legs are stretched, then relax and repeat. This is a good exercise to do when you are sitting for long periods of time. · Get out of bed as soon as you can after an illness or surgery. If you need to stay in bed, do the leg exercise noted above every hour when you are awake. · Use special stockings called compression stockings. These stockings are tight at the feet with a gradually looser fit on the leg. Many doctors recommend that you wear compression stockings during a journey longer than 8 hours. · Take breaks when you are on long trips. Stop the car and walk around. On long airplane flights, walk up and down the aisle hourly, flex and point your feet every 20 minutes while sitting, and drink plenty of water. · Take blood-thinning medicines before and after some types of surgery if your doctor recommends it. Blood thinners also may be used if you are likely to develop clots. How is DVT treated? Treatment for DVT usually involves taking blood thinners. These medicines are given through a vein (intravenously, or IV) or as a pill. You will have blood tests often so your doctor can see how well the blood thinners are working. Your doctor also may suggest that you prop up or elevate your leg when possible, take walks, and wear compression stockings. These measures may help reduce the pain and swelling that can happen with DVT. Follow-up care is a key part of your treatment and safety. Be sure to make and go to all appointments, and call your doctor if you are having problems. It's also a good idea to know your test results and keep a list of the medicines you take.   Tips to Prevent Blood Clots: After Your Visit  Your Care Instructions  Blood clots can lead to serious problems, including DVT and pulmonary embolism. A DVT, if not treated, can cause blood clots in these veins to get bigger, break loose, and travel through the bloodstream to the lungs. Pulmonary embolism is the sudden blockage of an artery in the lung. This is a life-threatening emergency. Anything that makes you more likely to form blood clots increases your risk of DVT and pulmonary embolism. It is important to know your risk factors, as well as the steps you and your doctor can take to reduce your risk for blood clots. Follow-up care is a key part of your treatment and safety. Be sure to make and go to all appointments, and call your doctor if you are having problems. It's also a good idea to know your test results and keep a list of the medicines you take. How can you care for yourself at home? Know what increases your risk for blood clots  · You might be at risk for blood clots because of a medical procedure or hospital stay. Things that increase your risk include:  ¨ Recent surgery that involved the legs or belly. ¨ Staying in bed for 72 hours or more after surgery or a serious illness. ¨ Some diseases, such as cancer, heart failure, stroke, or a severe infection. ¨ Pregnancy and childbirth (especially if you had a  section). · Other things can increase your risk for blood clots, including:  ¨ Being inactive for long periods, or sitting for 6 hours or longer, such as on a flight or car trip. ¨ Having blood that clots too easily, a problem that may run in families. ¨ Taking birth control pills or hormone therapy. ¨ Smoking. ¨ Being overweight. To prevent a DVT or pulmonary embolism  · If your doctor has prescribed anticoagulant medicines, take them exactly as directed. · Get up out of bed as soon as possible after an illness or surgery. · Exercise. Keep blood moving in your legs to keep clots from forming. If you are traveling by car, stop every hour or so.  Get out and walk around for a few minutes. If you are traveling by bus, train, or plane, get out of your seat and walk up and down the aisles every hour or so. You also can do leg exercises while you are seated. Pump your feet up and down by pulling your toes up toward your knees and then pointing them down (see below.)  · Do not smoke. If you need help quitting, talk to your doctor about stop-smoking programs and medicines. These can increase your chances of quitting for good. · Check with your doctor before taking birth control pills or hormone replacement therapy. These may increase your risk of blot clots. · Ask your doctor about wearing compression stockings to help prevent blood clots in your legs. You can buy these with a prescription at medical supply stores and some drugstores. When should you call for help? Call 911 anytime you think you may need emergency care. For example, call if:  · You have signs of pulmonary embolism. These may include:  ¨ Sudden shortness of breath. ¨ Sudden, sharp chest pain that may get worse when you breathe deeply or cough. ¨ Coughing up blood or pink, foamy mucus. ¨ Fast heart rate. ¨ Severe anxiety. ¨ Fainting. Call your doctor now or seek immediate medical care if:  · You have signs of a deep vein thrombosis. These may include:  ¨ Swelling. ¨ Warmth. ¨ Pain or tenderness. The pain may be in the calf or thigh and may be present only when the affected area is touched or when standing or walking. ¨ Redness. Watch closely for changes in your health, and be sure to contact your doctor if:  · You do not get better as expected. Ankle Pumps  Ankle pumps increase the circulation of oxygenated blood to your lower extremities and decrease your risk for circulation problems such as blood clots. They also stretch the muscles, tendons and ligaments in your foot and ankle, and prevent joint contracture in the ankle and foot, especially after surgeries on the legs.     It is important to do ankle pump exercises regularly after surgery because immobility increases your risk for developing a blood clot. Your doctor may also have you take an Aspirin for the next few days as well. If your doctor did not ask you to take an Aspirin, consult with him before starting Aspirin therapy on your own. This exercise is as follows:  2. Slowly point your foot forward, feeling the muscles on the top of your lower leg stretch, and hold this position for 5 seconds. 3. Next, pull your foot back toward you as far as possible, stretching the calf muscles, and hold that position for 5 seconds. 4. Repeat with the other foot. 5. Perform 10 repetitions every hour while awake for both ankles if possible (down and then up with the foot once is one repetition). You should feel gentle stretching of the muscles in your lower leg when doing this exercise. If you feel pain, or your range of motion is limited, don't  Push too hard. Only go the limit your joint and muscles will let you go. If you have increasing pain, progressively worsening leg warmth or swelling, STOP the exercise and call your doctor.

## 2018-01-03 NOTE — H&P
Surgery History and Phsyical    Subjective:      Carl Doshi is a 69 y.o. female who is well known to me. Pt had screening mammogram and a right sided breast abnormality was noted. This was biopsied and found to be a radial scar. No complaints. Pt here for surgical excision. Past Medical History:   Diagnosis Date    Angina pectoris (HCC)     Arrhythmia     atrial fib    Arthritis     polymyalgia rheumatica    Chronic pain     back/neck    GERD (gastroesophageal reflux disease)     Glaucoma     Hypercholesteremia     Hypertension     Ill-defined condition     frequent nose bleeds; requiring cauterization x3    Nausea & vomiting     ether    PUD (peptic ulcer disease)     teenage    Sleep apnea     CPAP but does not use      Past Surgical History:   Procedure Laterality Date    ABDOMEN SURGERY PROC UNLISTED  1964    exploratory abd surgery for ruptured appendix    HX APPENDECTOMY      1962    HX GYN  1972    vaginal hysterectomy    HX ORTHOPAEDIC  1989    back surgery (L4-L5)    HX ORTHOPAEDIC Right 1989    shoulder (bone spur)    HX OTHER SURGICAL  1993    fatty tumor removed neck    HX UROLOGICAL  1972    bladder tack      No family history on file.   Social History     Social History    Marital status: SINGLE     Spouse name: N/A    Number of children: N/A    Years of education: N/A     Social History Main Topics    Smoking status: Never Smoker    Smokeless tobacco: Never Used    Alcohol use Yes      Comment: rare    Drug use: No    Sexual activity: Not Asked     Other Topics Concern    None     Social History Narrative      Current Facility-Administered Medications   Medication Dose Route Frequency    ceFAZolin 200 mg/5 mL in NS (from 2 g bag) (ANCEF) IVPB for Graded Allergy Challenge 0.2 g  200 mg IntraVENous ONCE    Followed by   [facility] ceFAZolin 1.8 g/45 mL in NS (from 2 g/50 mL bag) (ANCEF) IVPB bag for Graded Allergy Challenge-2nd dose 1.8 g  1.8 g IntraVENous ONCE Allergies   Allergen Reactions    Zetia [Ezetimibe] Other (comments)     Muscle cramps      Amoxicillin Rash    Aspirin Other (comments)     Severe nose bleeds      Cardizem [Diltiazem Hcl] Cough    Compazine [Prochlorperazine] Unknown (comments)     Tardive dyskensia      Crestor [Rosuvastatin] Other (comments)     Muscle cramps      Demerol [Meperidine] Unknown (comments)     hallucinations    Lipitor [Atorvastatin] Other (comments)     Muscle cramps      Mucinex [Guaifenesin] Other (comments)     Dry mucous membranes      Penicillins Hives    Simvastatin Other (comments)     Muscle cramps      Sulfa (Sulfonamide Antibiotics) Hives       Review of Systems:  A comprehensive review of systems was negative except for:  Constitutional: positive for none  Eyes: positive for none  Ears, nose, mouth, throat, and face: positive for none  Respiratory: positive for negative  Cardiovascular: positive for none  Gastrointestinal: positive for none  Genitourinary: positive for none  Integument/breast: positive for none  Hematologic/lymphatic: positive for none  Musculoskeletal: positive for none  Neurological: positive for none  Behvioral/Psych: positive for none  Endocrine: positive for none  Allergic/Immunologic: positive for none    Objective:      Patient Vitals for the past 8 hrs:   Height Weight   01/03/18 0841 5' 1\" (1.549 m) 61.2 kg (135 lb)       No data recorded. Physical Exam:  General:  Alert, cooperative, no distress, appears stated age. Eyes:  Conjunctivae/corneas clear. HENT: Normocephalic, atraumatic       Neck: Supple, no JVD. Back:   Symmetric, no curvature. ROM normal. No CVA tenderness. Lungs:   Clear to auscultation bilaterally. Heart:  Regular rate and rhythm, S1, S2 normal, no murmur, click, rub or gallop. Abdomen:   Soft, non-tender. Musculoskeletal: Extremities normal, atraumatic, no cyanosis or edema.    Psych: Normal affect   Breast:  symmetrical   Lymph nodes: Cervical, supraclavicular, and axillary nodes normal.     Labs: No results for input(s): WBC, HGB, HCT, PLT, HGBEXT, HCTEXT, PLTEXT in the last 72 hours. No results for input(s): NA, K, CL, CO2, GLU, BUN, CREA, CA, MG, PHOS, ALB, TBIL, TBILI, SGOT, ALT in the last 72 hours. No results for input(s): INR in the last 72 hours. No lab exists for component: INREXT    Assessment:     Right breast radial scar    Plan:     Discussed the risk of surgery including bleeding, infection, upstaging of final pathology,  and the risks of general anesthetic. The patient understands the risks; any and all questions were answered to the patient's satisfaction.     Signed By: uSe Leiva MD     January 3, 2018

## 2021-01-26 ENCOUNTER — APPOINTMENT (OUTPATIENT)
Dept: MRI IMAGING | Age: 81
End: 2021-01-26
Attending: EMERGENCY MEDICINE
Payer: MEDICARE

## 2021-01-26 ENCOUNTER — APPOINTMENT (OUTPATIENT)
Dept: CT IMAGING | Age: 81
End: 2021-01-26
Attending: EMERGENCY MEDICINE
Payer: MEDICARE

## 2021-01-26 ENCOUNTER — HOSPITAL ENCOUNTER (EMERGENCY)
Age: 81
Discharge: HOME | End: 2021-01-26
Attending: EMERGENCY MEDICINE | Admitting: EMERGENCY MEDICINE
Payer: MEDICARE

## 2021-01-26 VITALS
SYSTOLIC BLOOD PRESSURE: 164 MMHG | RESPIRATION RATE: 12 BRPM | TEMPERATURE: 97.4 F | WEIGHT: 136.2 LBS | BODY MASS INDEX: 25.71 KG/M2 | HEIGHT: 61 IN | HEART RATE: 67 BPM | OXYGEN SATURATION: 100 % | DIASTOLIC BLOOD PRESSURE: 59 MMHG

## 2021-01-26 DIAGNOSIS — R20.2 PARESTHESIA: Primary | ICD-10-CM

## 2021-01-26 LAB
ALBUMIN SERPL-MCNC: 3.9 G/DL (ref 3.5–5)
ALBUMIN/GLOB SERPL: 1 {RATIO} (ref 1.1–2.2)
ALP SERPL-CCNC: 113 U/L (ref 45–117)
ALT SERPL-CCNC: 25 U/L (ref 12–78)
ANION GAP SERPL CALC-SCNC: 3 MMOL/L (ref 5–15)
AST SERPL-CCNC: 19 U/L (ref 15–37)
BASOPHILS # BLD: 0 K/UL (ref 0–0.1)
BASOPHILS NFR BLD: 0 % (ref 0–1)
BILIRUB SERPL-MCNC: 0.3 MG/DL (ref 0.2–1)
BUN SERPL-MCNC: 18 MG/DL (ref 6–20)
BUN/CREAT SERPL: 23 (ref 12–20)
CALCIUM SERPL-MCNC: 9.8 MG/DL (ref 8.5–10.1)
CHLORIDE SERPL-SCNC: 106 MMOL/L (ref 97–108)
CO2 SERPL-SCNC: 31 MMOL/L (ref 21–32)
COMMENT, HOLDF: NORMAL
CREAT SERPL-MCNC: 0.79 MG/DL (ref 0.55–1.02)
DIFFERENTIAL METHOD BLD: NORMAL
EOSINOPHIL # BLD: 0.1 K/UL (ref 0–0.4)
EOSINOPHIL NFR BLD: 1 % (ref 0–7)
ERYTHROCYTE [DISTWIDTH] IN BLOOD BY AUTOMATED COUNT: 13 % (ref 11.5–14.5)
GLOBULIN SER CALC-MCNC: 3.8 G/DL (ref 2–4)
GLUCOSE BLD STRIP.AUTO-MCNC: 157 MG/DL (ref 65–100)
GLUCOSE SERPL-MCNC: 144 MG/DL (ref 65–100)
HCT VFR BLD AUTO: 41.5 % (ref 35–47)
HGB BLD-MCNC: 13.2 G/DL (ref 11.5–16)
IMM GRANULOCYTES # BLD AUTO: 0 K/UL (ref 0–0.04)
IMM GRANULOCYTES NFR BLD AUTO: 0 % (ref 0–0.5)
INR PPP: 1 (ref 0.9–1.1)
LYMPHOCYTES # BLD: 2.1 K/UL (ref 0.8–3.5)
LYMPHOCYTES NFR BLD: 24 % (ref 12–49)
MCH RBC QN AUTO: 29.3 PG (ref 26–34)
MCHC RBC AUTO-ENTMCNC: 31.8 G/DL (ref 30–36.5)
MCV RBC AUTO: 92.2 FL (ref 80–99)
MONOCYTES # BLD: 0.5 K/UL (ref 0–1)
MONOCYTES NFR BLD: 6 % (ref 5–13)
NEUTS SEG # BLD: 6.2 K/UL (ref 1.8–8)
NEUTS SEG NFR BLD: 69 % (ref 32–75)
NRBC # BLD: 0 K/UL (ref 0–0.01)
NRBC BLD-RTO: 0 PER 100 WBC
PLATELET # BLD AUTO: 280 K/UL (ref 150–400)
PMV BLD AUTO: 12.3 FL (ref 8.9–12.9)
POTASSIUM SERPL-SCNC: 3.3 MMOL/L (ref 3.5–5.1)
PROT SERPL-MCNC: 7.7 G/DL (ref 6.4–8.2)
PROTHROMBIN TIME: 10.3 SEC (ref 9–11.1)
RBC # BLD AUTO: 4.5 M/UL (ref 3.8–5.2)
SAMPLES BEING HELD,HOLD: NORMAL
SERVICE CMNT-IMP: ABNORMAL
SODIUM SERPL-SCNC: 140 MMOL/L (ref 136–145)
TROPONIN I SERPL-MCNC: <0.05 NG/ML
WBC # BLD AUTO: 9 K/UL (ref 3.6–11)

## 2021-01-26 PROCEDURE — 99285 EMERGENCY DEPT VISIT HI MDM: CPT

## 2021-01-26 PROCEDURE — 85025 COMPLETE CBC W/AUTO DIFF WBC: CPT

## 2021-01-26 PROCEDURE — 80053 COMPREHEN METABOLIC PANEL: CPT

## 2021-01-26 PROCEDURE — 70496 CT ANGIOGRAPHY HEAD: CPT

## 2021-01-26 PROCEDURE — 85610 PROTHROMBIN TIME: CPT

## 2021-01-26 PROCEDURE — 74011000636 HC RX REV CODE- 636: Performed by: EMERGENCY MEDICINE

## 2021-01-26 PROCEDURE — 0042T CT CODE NEURO PERF W CBF: CPT

## 2021-01-26 PROCEDURE — 82962 GLUCOSE BLOOD TEST: CPT

## 2021-01-26 PROCEDURE — 84484 ASSAY OF TROPONIN QUANT: CPT

## 2021-01-26 PROCEDURE — 70551 MRI BRAIN STEM W/O DYE: CPT

## 2021-01-26 PROCEDURE — 36415 COLL VENOUS BLD VENIPUNCTURE: CPT

## 2021-01-26 PROCEDURE — 70450 CT HEAD/BRAIN W/O DYE: CPT

## 2021-01-26 PROCEDURE — 93005 ELECTROCARDIOGRAM TRACING: CPT

## 2021-01-26 RX ADMIN — IOPAMIDOL 50 ML: 755 INJECTION, SOLUTION INTRAVENOUS at 16:40

## 2021-01-26 RX ADMIN — IOPAMIDOL 100 ML: 755 INJECTION, SOLUTION INTRAVENOUS at 16:40

## 2021-01-26 NOTE — ED PROVIDER NOTES
81-year-old female with history of A. fib, arthritis, GERD, hypertension, peptic ulcer disease presents to the emergency department today with chief complaint of sudden onset of right-sided facial numbness which also involved her right lower extremity. The numbness has since resolved and she has residual paresthesia in the right face. She never had any muscle weakness. This occurred suddenly at approximate 1145. The history is provided by the patient and medical records. Extremity Weakness   This is a new problem. The current episode started 3 to 5 hours ago. The problem occurs constantly. The problem has been resolved. The patient is experiencing no pain. Associated symptoms include numbness and tingling. Pertinent negatives include full range of motion, no stiffness, no itching, no back pain and no neck pain. There has been no history of extremity trauma.         Past Medical History:   Diagnosis Date    Angina pectoris (HCC)     Arrhythmia     atrial fib    Arthritis     polymyalgia rheumatica    Chronic pain     back/neck    GERD (gastroesophageal reflux disease)     Glaucoma     Hypercholesteremia     Hypertension     Ill-defined condition     frequent nose bleeds; requiring cauterization x3    Nausea & vomiting     ether    PUD (peptic ulcer disease)     teenage    Sleep apnea     CPAP but does not use        Past Surgical History:   Procedure Laterality Date    ABDOMEN SURGERY PROC UNLISTED  1964    exploratory abd surgery for ruptured appendix    BREAST SURGERY PROCEDURE UNLISTED  11/2017    breast biopsy right    HX APPENDECTOMY      1962    HX BREAST BIOPSY Right 1/3/2018    RIGHT NEEDLE LOCALIZATION BREAST BIOPSY performed by Hector Feng [facility] at [facility]    vaginal hysterectomy    HX ORTHOPAEDIC  1989    back surgery (L4-L5)    HX ORTHOPAEDIC Right 1989    shoulder (bone spur)    HX OTHER SURGICAL  1993    fatty tumor removed neck    HX OTHER SURGICAL  2013 repair of rectal fistula   • HX UROLOGICAL  1972    bladder tack         No family history on file.    Social History     Socioeconomic History   • Marital status: SINGLE     Spouse name: Not on file   • Number of children: Not on file   • Years of education: Not on file   • Highest education level: Not on file   Occupational History   • Not on file   Social Needs   • Financial resource strain: Not on file   • Food insecurity     Worry: Not on file     Inability: Not on file   • Transportation needs     Medical: Not on file     Non-medical: Not on file   Tobacco Use   • Smoking status: Never Smoker   • Smokeless tobacco: Never Used   Substance and Sexual Activity   • Alcohol use: Yes     Comment: rare   • Drug use: No   • Sexual activity: Not on file   Lifestyle   • Physical activity     Days per week: Not on file     Minutes per session: Not on file   • Stress: Not on file   Relationships   • Social connections     Talks on phone: Not on file     Gets together: Not on file     Attends Caodaism service: Not on file     Active member of club or organization: Not on file     Attends meetings of clubs or organizations: Not on file     Relationship status: Not on file   • Intimate partner violence     Fear of current or ex partner: Not on file     Emotionally abused: Not on file     Physically abused: Not on file     Forced sexual activity: Not on file   Other Topics Concern   • Not on file   Social History Narrative   • Not on file         ALLERGIES: Zetia [ezetimibe], Amoxicillin, Aspirin, Cardizem [diltiazem hcl], Compazine [prochlorperazine], Crestor [rosuvastatin], Demerol [meperidine], Lipitor [atorvastatin], Mucinex [guaifenesin], Penicillins, Simvastatin, and Sulfa (sulfonamide antibiotics)    Review of Systems   Constitutional: Negative for fatigue and fever.   HENT: Negative for sneezing and sore throat.    Respiratory: Negative for cough and shortness of breath.    Cardiovascular: Negative for chest pain and  leg swelling. Gastrointestinal: Negative for abdominal pain, diarrhea, nausea and vomiting. Genitourinary: Negative for difficulty urinating and dysuria. Musculoskeletal: Negative for arthralgias, back pain, myalgias, neck pain and stiffness. Skin: Negative for color change, itching and rash. Neurological: Positive for tingling, weakness and numbness. Negative for headaches. Psychiatric/Behavioral: Negative for agitation and behavioral problems. Vitals:    01/26/21 1449   BP: (!) 189/111   Pulse: (!) 106   Resp: 18   Temp: 97.4 °F (36.3 °C)   SpO2: 99%   Weight: 61.8 kg (136 lb 3.9 oz)   Height: 5' 1\" (1.549 m)            Physical Exam  Vitals signs and nursing note reviewed. Constitutional:       General: She is not in acute distress. Appearance: Normal appearance. She is normal weight. She is not ill-appearing, toxic-appearing or diaphoretic. HENT:      Head: Normocephalic and atraumatic. Nose: Nose normal.      Mouth/Throat:      Mouth: Mucous membranes are moist.      Pharynx: Oropharynx is clear. Eyes:      Extraocular Movements: Extraocular movements intact. Conjunctiva/sclera: Conjunctivae normal.      Pupils: Pupils are equal, round, and reactive to light. Neck:      Musculoskeletal: Normal range of motion and neck supple. Cardiovascular:      Rate and Rhythm: Normal rate and regular rhythm. Pulses: Normal pulses. Heart sounds: Normal heart sounds. Pulmonary:      Effort: Pulmonary effort is normal. No respiratory distress. Breath sounds: Normal breath sounds. Abdominal:      General: There is no distension. Palpations: Abdomen is soft. Tenderness: There is no abdominal tenderness. There is no guarding or rebound. Musculoskeletal: Normal range of motion. General: No swelling, tenderness, deformity or signs of injury. Right lower leg: No edema. Left lower leg: No edema. Skin:     General: Skin is warm and dry. Capillary Refill: Capillary refill takes less than 2 seconds. Coloration: Skin is not jaundiced. Neurological:      General: No focal deficit present. Mental Status: She is alert and oriented to person, place, and time. GCS: GCS eye subscore is 4. GCS verbal subscore is 5. GCS motor subscore is 6. Cranial Nerves: Cranial nerves are intact. Sensory: Sensation is intact. Motor: Motor function is intact. Coordination: Coordination is intact. Gait: Gait is intact. Psychiatric:         Mood and Affect: Mood normal.         Behavior: Behavior normal.          MDM  Number of Diagnoses or Management Options  Diagnosis management comments: 52-year-old female presents as above with an episode of numbness which has since resolved. After reassuring work-up in the emergency department including neuroimaging with MRI plan to discharge with outpatient follow-up with neurology. Amount and/or Complexity of Data Reviewed  Clinical lab tests: reviewed  Tests in the radiology section of CPT®: reviewed  Tests in the medicine section of CPT®: reviewed      ED Course as of Jan 26 1937   Tue Jan 26, 2021   [address] CONT [JM]      ED Course User Index  [JM] Murtaza Rudolph MD       Procedures            3381: Discussed with teleneurology, Dr. Keegan Barger, who will see the patient via the robot. 1524: Discussed with teleneurology, Dr. Keegan Barger, patient is not a TPA candidate given resolution of symptoms. Potential TIA vs seizure vs other. Full dose asa and ?keppra. 1731: Patient and son updated on patient's status and neurology recommendations. Patient would like MRI. I anticipate likely discharge if MRI is normal with outpatient follow-up with neurology. ED EKG interpretation:  Rhythm: normal sinus rhythm. Rate (approx.): 62.  Axis: normal.  ST segment:  No concerning ST elevations or depressions. This EKG was interpreted by Daphne Palma MD,ED Provider.

## 2021-01-26 NOTE — ED NOTES
Patient to CT via wheelchair from triage with this RN     Per Dr Raynald Dance, CT w/o contrast at this time

## 2021-01-26 NOTE — PROGRESS NOTES
Spiritual Care Assessment/Progress Note  [address]      NAME: Yolanda Lobo      MRN: [id number]  AGE: [de-identified] y.o. SEX: female  Anglican Affiliation: Daphne Brantley   Language: English     1/26/2021     Total Time (in minutes): 6     Spiritual Assessment begun in [facility] EMERGENCY DEPT through conversation with:         [x]Patient        [] Family    [] Friend(s)        Reason for Consult: Crisis     Spiritual beliefs: (Please include comment if needed)     [] Identifies with a fernando tradition:         [] Supported by a fernando community:            [] Claims no spiritual orientation:           [] Seeking spiritual identity:                [] Adheres to an individual form of spirituality:           [x] Not able to assess:                           Identified resources for coping:      [] Prayer                               [] Music                  [] Guided Imagery     [] Family/friends                 [] Pet visits     [] Devotional reading                         [x] Unknown     [] Other:                                             Interventions offered during this visit: (See comments for more details)    Patient Interventions: Other (comment)           Plan of Care:     [] Support spiritual and/or cultural needs    [] Support AMD and/or advance care planning process      [] Support grieving process   [] Coordinate Rites and/or Rituals    [] Coordination with community clergy   [] No spiritual needs identified at this time   [] Detailed Plan of Care below (See Comments)  [] Make referral to Music Therapy  [] Make referral to Pet Therapy     [] Make referral to Addiction services  [] Make referral to [facility]  [] Make referral to Spiritual Care Partner  [] No future visits requested        [x] Follow up upon further referrals     Comments:  's visit was in the ER, in response to a [facility] page. Pt, Miss Katiuska Reyes, was still being assessed by staff.  Spiritual care is available for support upon referrals. Visited by: Lauren Ricardo.   To page : [phone]

## 2021-01-26 NOTE — ED TRIAGE NOTES
Pt here for right arm tingling that radiates up arm to right side of face  And down to right foot around 114. . Hypertensive in triage.

## 2021-01-27 LAB
ATRIAL RATE: 62 BPM
CALCULATED P AXIS, ECG09: 75 DEGREES
CALCULATED R AXIS, ECG10: 63 DEGREES
CALCULATED T AXIS, ECG11: 43 DEGREES
DIAGNOSIS, 93000: NORMAL
P-R INTERVAL, ECG05: 138 MS
Q-T INTERVAL, ECG07: 426 MS
QRS DURATION, ECG06: 74 MS
QTC CALCULATION (BEZET), ECG08: 432 MS
VENTRICULAR RATE, ECG03: 62 BPM

## 2021-01-27 NOTE — ED NOTES
Bedside and Verbal shift change report given to [facility] (oncoming nurse) by Jose Mackenzie (offgoing nurse). Report included the following information SBAR, ED Summary, MAR and Recent Results.

## 2021-01-27 NOTE — DISCHARGE INSTRUCTIONS
Please begin taking an aspirin every day until follow-up with neurology. Return to the emergency department for new or worsening symptoms or other concerns.

## 2021-03-23 ENCOUNTER — OFFICE VISIT (OUTPATIENT)
Dept: NEUROLOGY | Age: 81
End: 2021-03-23
Payer: MEDICARE

## 2021-03-23 VITALS
DIASTOLIC BLOOD PRESSURE: 66 MMHG | HEIGHT: 61 IN | HEART RATE: 56 BPM | TEMPERATURE: 97.5 F | WEIGHT: 133 LBS | SYSTOLIC BLOOD PRESSURE: 186 MMHG | RESPIRATION RATE: 16 BRPM | BODY MASS INDEX: 25.11 KG/M2 | OXYGEN SATURATION: 100 %

## 2021-03-23 DIAGNOSIS — G45.9 TIA (TRANSIENT ISCHEMIC ATTACK): Primary | ICD-10-CM

## 2021-03-23 PROCEDURE — G8419 CALC BMI OUT NRM PARAM NOF/U: HCPCS | Performed by: PSYCHIATRY & NEUROLOGY

## 2021-03-23 PROCEDURE — 1090F PRES/ABSN URINE INCON ASSESS: CPT | Performed by: PSYCHIATRY & NEUROLOGY

## 2021-03-23 PROCEDURE — G8400 PT W/DXA NO RESULTS DOC: HCPCS | Performed by: PSYCHIATRY & NEUROLOGY

## 2021-03-23 PROCEDURE — G8432 DEP SCR NOT DOC, RNG: HCPCS | Performed by: PSYCHIATRY & NEUROLOGY

## 2021-03-23 PROCEDURE — G8536 NO DOC ELDER MAL SCRN: HCPCS | Performed by: PSYCHIATRY & NEUROLOGY

## 2021-03-23 PROCEDURE — 99204 OFFICE O/P NEW MOD 45 MIN: CPT | Performed by: PSYCHIATRY & NEUROLOGY

## 2021-03-23 PROCEDURE — 1101F PT FALLS ASSESS-DOCD LE1/YR: CPT | Performed by: PSYCHIATRY & NEUROLOGY

## 2021-03-23 PROCEDURE — G8427 DOCREV CUR MEDS BY ELIG CLIN: HCPCS | Performed by: PSYCHIATRY & NEUROLOGY

## 2021-03-23 RX ORDER — LATANOPROST 50 UG/ML
SOLUTION/ DROPS OPHTHALMIC
COMMUNITY
Start: 2021-01-12

## 2021-03-23 NOTE — PROGRESS NOTES
Luis Angel Rice is a [de-identified] y.o. female  Chief Complaint   Patient presents with    New Patient    Numbness     Right side    Referral Follow Up     From [facility] Due   Topic Date Due    COVID-19 Vaccine (1) Never done    DTaP/Tdap/Td series (1 - Tdap) Never done    Shingrix Vaccine Age 50> (1 of 2) Never done    Bone Densitometry (Dexa) Screening  Never done    Pneumococcal 65+ years (1 of 1 - PPSV23) Never done    Flu Vaccine (1) Never done    Medicare Yearly Exam  Never done     Visit Vitals  BP (!) 186/66   Pulse (!) 56   Temp 97.5 °F (36.4 °C) (Temporal)   Resp 16   Ht 5' 1\" (1.549 m)   Wt 133 lb (60.3 kg)   SpO2 100%   BMI 25.13 kg/m²     Numbness on the right side (mostly in the right hand).

## 2021-03-23 NOTE — PROGRESS NOTES
[facility] and [address] at [facility] at [address] [place] [address], [address] [facility] [address]  [phone] Office  [phone] Facsimile           Referring: Trinity Crawford MD      Chief Complaint   Patient presents with    New Patient    Numbness     Right side    Referral Follow Up     From [facility]   32-year-old lady who comes for initial neurologic consultation regarding acute change in her neurologic status. Patient tells me at the end of January she was at home on the phone. She had the sudden onset of numbness tingling and a shocklike sensation in her right hand. It went up her right upper extremity into the face. She went into the bathroom and felt as though her right face was drooping. She looked and saw that her lip was stuck as she calls it and she describes a droop. This sensation then went down the right side of the body into the leg. She was able to ambulate. She did not have any weakness. It lasted just a few minutes and then spontaneously resolved. She called her neighbor and told her. She called the doctor's office. She was advised to go to the emergency room. She made herself something to eat and then when it returned she came to the emergency department. She was evaluated in the ER with a CT of the head as well as a CTA and an MRI and then she was discharged. She did not have any stroke education. Lipids were not checked. She does tell me she has a history of atrial fibrillation and notes that she herself stopped her Eliquis when her atrial fibrillation had not recurred on her last follow-up. She was not recommended to start her Eliquis upon leaving the ER. When she followed up with her personal physician after the ER visit her Eliquis was resumed. She has not had any further episodes of neurologic symptoms.   She does tell me she has had difficulty with tolerating statins in the past with allergic type reactions. Record review finds an emergency department visit from January 26, 2021 where the patient came in complaining of sudden onset right-sided facial numbness also involving the right lower extremity. Symptoms resolved. She denied any weakness. She underwent imaging including CT of the head which I personally reviewed and was unremarkable as well as CTA that was unremarkable and she had an MRI of the brain that was unremarkable and I also reviewed that study. He was discharged from the emergency department. Laboratory analyses:  Troponin normal    Review of teleneurology consultation finds recommendation for telemetry with frequent neuro checks, permissive hypertension as well as MRI of the brain, euvolemia, aspirin 325 mg today. TTE with bubble study as well as stroke labs etc.  Question of seizure was also raised and recommendation for Keppra was given.   Patient was discharged  PT normal  Metabolic panel potassium 3.3 and glucose 144 otherwise unremarkable  CBC unremarkable  Past Medical History:   Diagnosis Date    Angina pectoris (HCC)     Arrhythmia     atrial fib    Arthritis     polymyalgia rheumatica    Chronic pain     back/neck    GERD (gastroesophageal reflux disease)     Glaucoma     Hypercholesteremia     Hypertension     Ill-defined condition     frequent nose bleeds; requiring cauterization x3    Nausea & vomiting     ether    PUD (peptic ulcer disease)     teenage    Sleep apnea     CPAP but does not use        Past Surgical History:   Procedure Laterality Date    HX APPENDECTOMY      1962    HX BREAST BIOPSY Right 1/3/2018    RIGHT NEEDLE LOCALIZATION BREAST BIOPSY performed by Ken Ge MD at [address] [place] [address] HX GYN  1972    vaginal hysterectomy    HX ORTHOPAEDIC  1989    back surgery (L4-L5)    HX ORTHOPAEDIC Right 1989    shoulder (bone spur)    HX OTHER SURGICAL  1993    fatty tumor removed neck    HX OTHER SURGICAL  2013    repair of rectal fistula    HX UROLOGICAL  1972    bladder tack    [place] ABDOMEN SURGERY PROC UNLISTED  1964    exploratory abd surgery for ruptured appendix    [place] BREAST SURGERY PROCEDURE UNLISTED  11/2017    breast biopsy right       Current Outpatient Medications   Medication Sig Dispense Refill    latanoprost (XALATAN) 0.005 % ophthalmic solution       HYDROcodone-acetaminophen (NORCO) 5-325 mg per tablet Take 1 Tab by mouth every four (4) hours as needed for Pain. Max Daily Amount: 6 Tabs. 30 Tab 0    dorzolamide (TRUSOPT) 2 % ophthalmic solution Administer 2 Drops to both eyes two (2) times a day.  OTHER Administer 1 Drop to both eyes three (3) times daily. Equate eye drops lubricant      atenolol (TENORMIN) 25 mg tablet Take 12.5 mg by mouth daily. If BP >140/90, take 12.5mg (takes at breakfast if needed)      predniSONE (DELTASONE) 1 mg tablet Take 2 mg by mouth every morning.  apixaban (ELIQUIS) 5 mg tablet Take 5 mg by mouth two (2) times a day. 1000/2200      pyridoxine, vitamin B6, (VITAMIN B-6) 100 mg tablet Take 100 mg by mouth every morning.  calcium-cholecalciferol, D3, (CALTRATE 600+D) tablet Take 1 Tab by mouth every morning.  FOLIC ACID/MULTIVIT-MIN/LUTEIN (CENTRUM SILVER PO) Take 1 Tab by mouth every morning.           Allergies   Allergen Reactions    Zetia [Ezetimibe] Other (comments)     Muscle cramps      Amoxicillin Rash    Aspirin Other (comments)     Severe nose bleeds      Cardizem [Diltiazem Hcl] Cough    Compazine [Prochlorperazine] Unknown (comments)     Tardive dyskensia      Crestor [Rosuvastatin] Other (comments)     Muscle cramps      Demerol [Meperidine] Unknown (comments)     hallucinations    Lipitor [Atorvastatin] Other (comments)     Muscle cramps      Mucinex [Guaifenesin] Other (comments)     Dry mucous membranes      Penicillins Hives     Ancef graded challenge done 1/3/18 with no reactions post.   Zannie Cowden Simvastatin Other (comments)     Muscle cramps      Sulfa (Sulfonamide Antibiotics) Hives       Social History     Tobacco Use    Smoking status: Never Smoker    Smokeless tobacco: Never Used   Substance Use Topics    Alcohol use: Yes     Comment: rare    Drug use: No       Family History   Problem Relation Age of Onset    Alcohol abuse Father        Examination  Visit Vitals  BP (!) 186/66   Pulse (!) 56   Temp 97.5 °F (36.4 °C) (Temporal)   Resp 16   Ht 5' 1\" (1.549 m)   Wt 60.3 kg (133 lb)   SpO2 100%   BMI 25.13 kg/m²     Neurologically, she is awake, alert, and oriented with normal speech and language. Her cognition is normal.    Intact cranial nerves 2-12. No nystagmus. Disk margins are flat bilaterally. She has normal bulk and tone. She has no abnormal movement. She has no pronation or drift. She generates full strength in the upper and lower extremities to direct confrontational testing. Reflexes are symmetrical in the upper and lower extremities bilaterally. No pathologic reflexes are elicited. Finger nose finger and rapid alternating movements are normal.  Steady gait. Impression/Plan  21-year-old lady with transient symptoms as noted above in the context of history of atrial fibrillation off Eliquis and certainly differential diagnosis includes transient cerebral ischemia i.e. TIA versus ictus and I think TIA is much more likely  Complete the work-up with an EEG as well as lipid panel  Stroke teaching given today  Continue Eliquis  Follow-up after her studies  If her LDL is 70 or greater she will need to have that treated and given her statin allergy will need to enlist the assistance of Dr. Justin Douglass in terms of alternative pharmacologic intervention    We will schedule a follow-up with our nurse practitioner for intensive stroke teaching to reinforce what was given today as well    Karina Matta MD        This note was created using voice recognition software.  Despite editing, there may be syntax errors.

## 2021-03-24 ENCOUNTER — OFFICE VISIT (OUTPATIENT)
Dept: NEUROLOGY | Age: 81
End: 2021-03-24

## 2021-03-24 DIAGNOSIS — G45.9 TIA (TRANSIENT ISCHEMIC ATTACK): Primary | ICD-10-CM

## 2021-03-25 LAB
CHOLEST SERPL-MCNC: 237 MG/DL (ref 100–199)
HDLC SERPL-MCNC: 70 MG/DL
LDLC SERPL CALC-MCNC: 153 MG/DL (ref 0–99)
TRIGL SERPL-MCNC: 79 MG/DL (ref 0–149)
VLDLC SERPL CALC-MCNC: 14 MG/DL (ref 5–40)

## 2021-03-29 ENCOUNTER — DOCUMENTATION ONLY (OUTPATIENT)
Dept: NEUROLOGY | Age: 81
End: 2021-03-29

## 2021-03-29 NOTE — PROGRESS NOTES
[facility] AT [place]   Carotid Doppler Report    Date of Service: 3/24/21  Referring: Dr. She Laughlin    B-mode imaging reveals mild mixed plaque at the bifurcations extending into the proximal internal carotid artery segments bilaterally. Doppler spectral analysis reveals no significant velocity shifts. Vertebral artery flow antegrade bilaterally.     Interpretation  Normal Study      Magi Perdomo MD

## 2021-03-29 NOTE — PROCEDURES
EEG:      Date:  03/24/21    Requesting Physician:  [address] [place] [address]. MD Nereyda    An EEG is requested in this 50-year-old lady with transient cerebral ischemia to evaluate for epileptiform abnormality. Medications:  Medications are said to include Norco, Tenormin, prednisone, Eliquis. This tracing is obtained during the awake state. During wakefulness the background consists of diffuse, low voltage, fast frequency, beta wave activity. Hyperventilation is not performed secondary to COVID-19 precautions. Intermittent photic stimulation induces symmetric posterior driving responses. Sleep is not attained. Interpretation:   This EEG recorded during the awake state is normal.

## 2021-04-01 ENCOUNTER — OFFICE VISIT (OUTPATIENT)
Dept: NEUROLOGY | Age: 81
End: 2021-04-01
Payer: MEDICARE

## 2021-04-01 VITALS
SYSTOLIC BLOOD PRESSURE: 142 MMHG | HEART RATE: 66 BPM | DIASTOLIC BLOOD PRESSURE: 78 MMHG | OXYGEN SATURATION: 99 % | TEMPERATURE: 97.2 F

## 2021-04-01 DIAGNOSIS — R53.1 WEAKNESS GENERALIZED: ICD-10-CM

## 2021-04-01 DIAGNOSIS — M79.641 PAIN IN BOTH HANDS: ICD-10-CM

## 2021-04-01 DIAGNOSIS — M19.90 ARTHRITIS: ICD-10-CM

## 2021-04-01 DIAGNOSIS — M79.642 PAIN IN BOTH HANDS: ICD-10-CM

## 2021-04-01 DIAGNOSIS — G45.9 TIA (TRANSIENT ISCHEMIC ATTACK): Primary | ICD-10-CM

## 2021-04-01 PROCEDURE — 99213 OFFICE O/P EST LOW 20 MIN: CPT | Performed by: NURSE PRACTITIONER

## 2021-04-01 PROCEDURE — 1101F PT FALLS ASSESS-DOCD LE1/YR: CPT | Performed by: NURSE PRACTITIONER

## 2021-04-01 PROCEDURE — G8428 CUR MEDS NOT DOCUMENT: HCPCS | Performed by: NURSE PRACTITIONER

## 2021-04-01 PROCEDURE — G8432 DEP SCR NOT DOC, RNG: HCPCS | Performed by: NURSE PRACTITIONER

## 2021-04-01 PROCEDURE — G8419 CALC BMI OUT NRM PARAM NOF/U: HCPCS | Performed by: NURSE PRACTITIONER

## 2021-04-01 PROCEDURE — G8400 PT W/DXA NO RESULTS DOC: HCPCS | Performed by: NURSE PRACTITIONER

## 2021-04-01 PROCEDURE — 1090F PRES/ABSN URINE INCON ASSESS: CPT | Performed by: NURSE PRACTITIONER

## 2021-04-01 PROCEDURE — G8536 NO DOC ELDER MAL SCRN: HCPCS | Performed by: NURSE PRACTITIONER

## 2021-04-01 RX ORDER — LOSARTAN POTASSIUM 50 MG/1
TABLET ORAL
COMMUNITY
Start: 2021-02-04

## 2021-04-01 RX ORDER — APIXABAN 2.5 MG/1
TABLET, FILM COATED ORAL
COMMUNITY
Start: 2021-03-08

## 2021-04-01 NOTE — PROGRESS NOTES
Fani Hernandez is a [de-identified] y.o. female who presents with the following  Chief Complaint   Patient presents with    Follow-up    Results       HPI    FU for EEG, Doppler and lipids. No stroke like symptoms since last visit. She is anxious about things. Trying to make changes. Significant reactions to statins, cholesterol medications but LDL is 153 and want to be below 70. Discussed this. She feels weak with strength, endurance, mobility. Also has some pain in arms and hands will want to help with PT            Allergies   Allergen Reactions    Zetia [Ezetimibe] Other (comments)     Muscle cramps      Amoxicillin Rash    Aspirin Other (comments)     Severe nose bleeds      Cardizem [Diltiazem Hcl] Cough    Compazine [Prochlorperazine] Unknown (comments)     Tardive dyskensia      Crestor [Rosuvastatin] Other (comments)     Muscle cramps      Demerol [Meperidine] Unknown (comments)     hallucinations    Lipitor [Atorvastatin] Other (comments)     Muscle cramps      Mucinex [Guaifenesin] Other (comments)     Dry mucous membranes      Penicillins Hives     Ancef graded challenge done 1/3/18 with no reactions post.    Simvastatin Other (comments)     Muscle cramps      Sulfa (Sulfonamide Antibiotics) Hives       Current Outpatient Medications   Medication Sig    losartan (COZAAR) 50 mg tablet TAKE 1 TABLET BY MOUTH ONCE DAILY FOR 30 DAYS    latanoprost (XALATAN) 0.005 % ophthalmic solution     HYDROcodone-acetaminophen (NORCO) 5-325 mg per tablet Take 1 Tab by mouth every four (4) hours as needed for Pain. Max Daily Amount: 6 Tabs.  dorzolamide (TRUSOPT) 2 % ophthalmic solution Administer 2 Drops to both eyes two (2) times a day.  OTHER Administer 1 Drop to both eyes three (3) times daily. Equate eye drops lubricant    atenolol (TENORMIN) 25 mg tablet Take 12.5 mg by mouth daily.  If BP >140/90, take 12.5mg (takes at breakfast if needed)    predniSONE (DELTASONE) 1 mg tablet Take 2 mg by mouth every morning.  pyridoxine, vitamin B6, (VITAMIN B-6) 100 mg tablet Take 100 mg by mouth every morning.  calcium-cholecalciferol, D3, (CALTRATE 600+D) tablet Take 1 Tab by mouth every morning.  FOLIC ACID/MULTIVIT-MIN/LUTEIN (CENTRUM SILVER PO) Take 1 Tab by mouth every morning.  Eliquis 2.5 mg tablet TAKE 1 TABLET BY MOUTH TWICE DAILY FOR 90 DAYS     No current facility-administered medications for this visit.         Social History     Tobacco Use   Smoking Status Never Smoker   Smokeless Tobacco Never Used       Past Medical History:   Diagnosis Date    Angina pectoris (HCC)     Arrhythmia     atrial fib    Arthritis     polymyalgia rheumatica    Chronic pain     back/neck    GERD (gastroesophageal reflux disease)     Glaucoma     Hypercholesteremia     Hypertension     Ill-defined condition     frequent nose bleeds; requiring cauterization x3    Nausea & vomiting     ether    PUD (peptic ulcer disease)     teenage    Sleep apnea     CPAP but does not use        Past Surgical History:   Procedure Laterality Date    HX APPENDECTOMY      1962    HX BREAST BIOPSY Right 1/3/2018    RIGHT NEEDLE LOCALIZATION BREAST BIOPSY performed by Kimberley Walker MD at [address] [place] [address] HX GYN  1972    vaginal hysterectomy    HX ORTHOPAEDIC  1989    back surgery (L4-L5)    HX ORTHOPAEDIC Right 1989    shoulder (bone spur)    HX OTHER SURGICAL  1993    fatty tumor removed neck    HX OTHER SURGICAL  2013    repair of rectal fistula    HX UROLOGICAL  1972    bladder tack    MT ABDOMEN SURGERY PROC UNLISTED  1964    exploratory abd surgery for ruptured appendix    MT BREAST SURGERY PROCEDURE UNLISTED  11/2017    breast biopsy right       Family History   Problem Relation Age of Onset    Alcohol abuse Father        Social History     Socioeconomic History    Marital status: SINGLE     Spouse name: Not on file    Number of children: Not on file    Years of education: Not on file    Highest education level: Not on file   Tobacco Use    Smoking status: Never Smoker    Smokeless tobacco: Never Used   Substance and Sexual Activity    Alcohol use: Yes     Comment: rare    Drug use: No       Review of Systems   Constitutional: Positive for malaise/fatigue. Eyes: Negative for blurred vision, double vision and photophobia. Gastrointestinal: Negative for nausea and vomiting. Neurological: Positive for weakness. Negative for dizziness, tingling, seizures, loss of consciousness and headaches. Remainder of comprehensive review is negative. Physical Exam :    Visit Vitals  BP (!) 142/78   Pulse 66   Temp 97.2 °F (36.2 °C)   SpO2 99%             Results for orders placed or performed in visit on 03/23/21   LIPID PANEL   Result Value Ref Range    Cholesterol, total 237 (H) 100 - 199 mg/dL    Triglyceride 79 0 - 149 mg/dL    HDL Cholesterol 70 >39 mg/dL    VLDL, calculated 14 5 - 40 mg/dL    LDL, calculated 153 (H) 0 - 99 mg/dL       Orders Placed This Encounter   [address]     Referral Priority:   Routine     Referral Type:   PT/OT/ST     Referral Reason:   Specialty Services Required     Requested Specialty:   Physical Therapy     Number of Visits Requested:   1    Eliquis 2.5 mg tablet     Sig: TAKE 1 TABLET BY MOUTH TWICE DAILY FOR 90 DAYS    losartan (COZAAR) 50 mg tablet     Sig: TAKE 1 TABLET BY MOUTH ONCE DAILY FOR 30 DAYS       1. TIA (transient ischemic attack)    2. Weakness generalized    3. Pain in both hands    4. Arthritis        Discussed labs and testing   Stroke guidelines, education   Will want her to get back in with PCP To look at other options at getting LDL below 70 per guidelines  Keep with cardiac with Eliquis. Stay active  Get into PT To help with strength, endurance, hand and arm pain,weakness. Modify lifestyle and diet.                This note will not be viewable in [email or web address]

## 2023-05-11 RX ORDER — DORZOLAMIDE HCL 20 MG/ML
2 SOLUTION/ DROPS OPHTHALMIC 2 TIMES DAILY
COMMUNITY

## 2023-05-11 RX ORDER — LOSARTAN POTASSIUM 50 MG/1
TABLET ORAL
COMMUNITY
Start: 2021-02-04

## 2023-05-11 RX ORDER — PYRIDOXINE HCL (VITAMIN B6) 100 MG
TABLET ORAL
COMMUNITY

## 2023-05-11 RX ORDER — ATENOLOL 25 MG/1
TABLET ORAL DAILY
COMMUNITY

## 2023-05-11 RX ORDER — HYDROCODONE BITARTRATE AND ACETAMINOPHEN 5; 325 MG/1; MG/1
1 TABLET ORAL EVERY 4 HOURS PRN
COMMUNITY
Start: 2018-01-03

## 2023-05-11 RX ORDER — PREDNISONE 1 MG/1
TABLET ORAL
COMMUNITY

## 2023-05-11 RX ORDER — LATANOPROST 50 UG/ML
SOLUTION/ DROPS OPHTHALMIC
COMMUNITY
Start: 2021-01-12

## 2023-05-11 RX ORDER — CALCIUM CARBONATE/VITAMIN D3 600 MG-10
1 TABLET ORAL
COMMUNITY

## 2023-10-04 ENCOUNTER — HOSPITAL ENCOUNTER (EMERGENCY)
Dept: HOSPITAL 82 - ED | Age: 83
Discharge: HOME | End: 2023-10-04
Payer: MEDICARE

## 2023-10-04 VITALS — DIASTOLIC BLOOD PRESSURE: 66 MMHG | SYSTOLIC BLOOD PRESSURE: 140 MMHG

## 2023-10-04 VITALS — DIASTOLIC BLOOD PRESSURE: 67 MMHG | SYSTOLIC BLOOD PRESSURE: 162 MMHG

## 2023-10-04 VITALS — SYSTOLIC BLOOD PRESSURE: 129 MMHG | DIASTOLIC BLOOD PRESSURE: 57 MMHG

## 2023-10-04 VITALS — SYSTOLIC BLOOD PRESSURE: 150 MMHG | DIASTOLIC BLOOD PRESSURE: 83 MMHG

## 2023-10-04 VITALS — SYSTOLIC BLOOD PRESSURE: 144 MMHG | DIASTOLIC BLOOD PRESSURE: 69 MMHG

## 2023-10-04 VITALS — HEIGHT: 60 IN | WEIGHT: 129.85 LBS | BODY MASS INDEX: 25.49 KG/M2

## 2023-10-04 VITALS — SYSTOLIC BLOOD PRESSURE: 180 MMHG | DIASTOLIC BLOOD PRESSURE: 88 MMHG

## 2023-10-04 VITALS — SYSTOLIC BLOOD PRESSURE: 136 MMHG | DIASTOLIC BLOOD PRESSURE: 69 MMHG

## 2023-10-04 VITALS — DIASTOLIC BLOOD PRESSURE: 68 MMHG | SYSTOLIC BLOOD PRESSURE: 156 MMHG

## 2023-10-04 VITALS — DIASTOLIC BLOOD PRESSURE: 73 MMHG | SYSTOLIC BLOOD PRESSURE: 184 MMHG

## 2023-10-04 VITALS — SYSTOLIC BLOOD PRESSURE: 153 MMHG | DIASTOLIC BLOOD PRESSURE: 82 MMHG

## 2023-10-04 VITALS — DIASTOLIC BLOOD PRESSURE: 90 MMHG | SYSTOLIC BLOOD PRESSURE: 147 MMHG

## 2023-10-04 VITALS — SYSTOLIC BLOOD PRESSURE: 145 MMHG | DIASTOLIC BLOOD PRESSURE: 62 MMHG

## 2023-10-04 VITALS — DIASTOLIC BLOOD PRESSURE: 69 MMHG | SYSTOLIC BLOOD PRESSURE: 136 MMHG

## 2023-10-04 VITALS — DIASTOLIC BLOOD PRESSURE: 70 MMHG | SYSTOLIC BLOOD PRESSURE: 163 MMHG

## 2023-10-04 VITALS — DIASTOLIC BLOOD PRESSURE: 65 MMHG | SYSTOLIC BLOOD PRESSURE: 151 MMHG

## 2023-10-04 DIAGNOSIS — E78.00: ICD-10-CM

## 2023-10-04 DIAGNOSIS — I10: ICD-10-CM

## 2023-10-04 DIAGNOSIS — I48.91: ICD-10-CM

## 2023-10-04 DIAGNOSIS — R00.1: Primary | ICD-10-CM

## 2023-10-04 DIAGNOSIS — I25.2: ICD-10-CM

## 2023-10-04 LAB
ALBUMIN SERPL-MCNC: 4.1 G/DL (ref 3.2–5)
ALP SERPL-CCNC: 99 U/L (ref 38–126)
ANION GAP SERPL CALCULATED.3IONS-SCNC: 12 MMOL/L
AST SERPL-CCNC: 43 U/L (ref 9–36)
BUN SERPL-MCNC: 19 MG/DL (ref 8–23)
BUN/CREAT SERPL: 25
CHLORIDE SERPL-SCNC: 105 MMOL/L (ref 95–108)
CO2 SERPL-SCNC: 27 MMOL/L (ref 22–30)
CREAT SERPL-MCNC: 0.8 MG/DL (ref 0.5–1)
POTASSIUM SERPL-SCNC: 4.1 MMOL/L (ref 3.5–5.1)
PROT SERPL-MCNC: 7 G/DL (ref 6.3–8.2)
SODIUM SERPL-SCNC: 139 MMOL/L (ref 137–146)

## (undated) DEVICE — DEVON™ KNEE AND BODY STRAP 60" X 3" (1.5 M X 7.6 CM): Brand: DEVON

## (undated) DEVICE — KENDALL SCD EXPRESS SLEEVES, KNEE LENGTH, MEDIUM: Brand: KENDALL SCD

## (undated) DEVICE — SUTURE VCRL SZ 3-0 L27IN ABSRB UD L26MM SH 1/2 CIR J416H

## (undated) DEVICE — SOLUTION IRRIG 1000ML H2O STRL BLT

## (undated) DEVICE — (D)PREP SKN CHLRAPRP APPL 26ML -- CONVERT TO ITEM 371833

## (undated) DEVICE — DEVICE TRNSF SPIK STL 2008S] MICROTEK MEDICAL INC]

## (undated) DEVICE — 3000CC GUARDIAN II: Brand: GUARDIAN

## (undated) DEVICE — SYR 10ML LUER LOK 1/5ML GRAD --

## (undated) DEVICE — TOWEL SURG W17XL27IN STD BLU COT NONFENESTRATED PREWASHED

## (undated) DEVICE — STERILE POLYISOPRENE POWDER-FREE SURGICAL GLOVES: Brand: PROTEXIS

## (undated) DEVICE — INSULATED BLADE ELECTRODE: Brand: EDGE

## (undated) DEVICE — ROCKER SWITCH PENCIL BLADE ELECTRODE, HOLSTER: Brand: EDGE

## (undated) DEVICE — SUTURE VCRL SZ 4-0 L27IN ABSRB UD L19MM PS-2 3/8 CIR PRIM J426H

## (undated) DEVICE — CHEST PACK: Brand: MEDLINE INDUSTRIES, INC.

## (undated) DEVICE — NEEDLE HYPO 22GA L1.5IN BLK S STL HUB POLYPR SHLD REG BVL

## (undated) DEVICE — COVER LT HNDL BLU PLAS

## (undated) DEVICE — INFECTION CONTROL KIT SYS